# Patient Record
Sex: MALE | Race: WHITE | NOT HISPANIC OR LATINO | Employment: FULL TIME | URBAN - METROPOLITAN AREA
[De-identification: names, ages, dates, MRNs, and addresses within clinical notes are randomized per-mention and may not be internally consistent; named-entity substitution may affect disease eponyms.]

---

## 2017-09-14 ENCOUNTER — OFFICE VISIT (OUTPATIENT)
Dept: FAMILY MEDICINE CLINIC | Facility: CLINIC | Age: 47
End: 2017-09-14

## 2017-09-14 VITALS
BODY MASS INDEX: 30.75 KG/M2 | DIASTOLIC BLOOD PRESSURE: 68 MMHG | TEMPERATURE: 98.6 F | WEIGHT: 227 LBS | SYSTOLIC BLOOD PRESSURE: 112 MMHG | OXYGEN SATURATION: 98 % | HEIGHT: 72 IN | HEART RATE: 60 BPM

## 2017-09-14 DIAGNOSIS — L81.9 HYPERPIGMENTATION: Primary | ICD-10-CM

## 2017-09-14 DIAGNOSIS — Z71.6 ENCOUNTER FOR TOBACCO USE CESSATION COUNSELING: ICD-10-CM

## 2017-09-14 DIAGNOSIS — I83.90 VARICOSE VEINS OF LOWER EXTREMITY: ICD-10-CM

## 2017-09-14 DIAGNOSIS — Z23 IMMUNIZATION DUE: ICD-10-CM

## 2017-09-14 DIAGNOSIS — Z72.0 SMOKELESS TOBACCO USE: ICD-10-CM

## 2017-09-14 PROCEDURE — 90471 IMMUNIZATION ADMIN: CPT | Performed by: NURSE PRACTITIONER

## 2017-09-14 PROCEDURE — 99214 OFFICE O/P EST MOD 30 MIN: CPT | Performed by: NURSE PRACTITIONER

## 2017-09-14 PROCEDURE — 90715 TDAP VACCINE 7 YRS/> IM: CPT | Performed by: NURSE PRACTITIONER

## 2017-09-14 NOTE — PROGRESS NOTES
"Subjective   Damian Valles III is a 47 y.o. male.     History of Present Illness     Re-establish care.  I have not seen him in 3 1/2 years.  He did see my assoc last year for tx of acute sinusitis.        \"Ring worm\" on right leg 6-7 years ago.  Cleared after 3 years--no eval.  Now for past 3 years noted very dark skin involving the area.      Daily smokeless tobacco use x 30 years.  Wants to quit.      Tetanus is not up to date.      The following portions of the patient's history were reviewed and updated as appropriate: allergies, current medications, past family history, past medical history, past social history, past surgical history and problem list.    Review of Systems   Respiratory: Negative.    Cardiovascular: Negative.    Skin:        Per HPI   All other systems reviewed and are negative.      Objective   Physical Exam   Constitutional: Vital signs are normal. He appears well-developed and well-nourished.   Cardiovascular: Normal rate, regular rhythm, S1 normal, S2 normal and normal heart sounds.    No murmur heard.  Nonthrombosed varicosities noted distal RLE   Pulmonary/Chest: Effort normal and breath sounds normal.   Neurological: He is alert.   Skin: Skin is warm and dry.        hyperpigementation   Psychiatric: He has a normal mood and affect.   Nursing note and vitals reviewed.      Assessment/Plan   Damian was seen today for follow-up.    Diagnoses and all orders for this visit:    Hyperpigmentation  -     Ambulatory Referral to Dermatology    Varicose veins of lower extremity    Immunization due    Smokeless tobacco use    Encounter for tobacco use cessation counseling    Other orders  -     Tdap Vaccine Greater Than or Equal To 8yo IM        5 min spent discussing risks of smokeless tobacco use and cessation methods/options.  Nicotine patches, nicotine gum, and Chantix were all dicussed.  He is going to think about it.     I think the hyperpigmentation he has is probably just benign " sequelae from the chronic skin rash had in remote past.  Will refer to derm for evaluation though.    RTC in next month for CPE/fasting labs/EKG.  Will check a PSA due to FH prostate CA--father.

## 2017-09-22 ENCOUNTER — TELEPHONE (OUTPATIENT)
Dept: FAMILY MEDICINE CLINIC | Facility: CLINIC | Age: 47
End: 2017-09-22

## 2017-09-22 NOTE — TELEPHONE ENCOUNTER
----- Message from YANA Bolden sent at 9/22/2017 12:24 PM EDT -----  Regarding: RE: Chantix  Let pt know that you called in rx into his pharmacy.    Please call the following prescriptions into his pharmacy:    Chantix Start Kit:  Sig: Take as directed.    Dispense #1 with 0 refills.    Chantix Continuation Kit:   Sig: Take as directed.  Dispense #1 with 2 refills.          ----- Message -----     From: Nam Vee MA     Sent: 9/22/2017  11:48 AM       To: YANA Bolden  Subject: Chantix                                          Patient was in on 9/14 and you all discussed Chantix. He said he thought about it, and he would like to start it.

## 2017-10-18 RX ORDER — VARENICLINE TARTRATE 1 MG/1
TABLET, FILM COATED ORAL
Refills: 2 | COMMUNITY
Start: 2017-09-22

## 2017-10-23 ENCOUNTER — TELEPHONE (OUTPATIENT)
Dept: FAMILY MEDICINE CLINIC | Facility: CLINIC | Age: 47
End: 2017-10-23

## 2017-10-23 ENCOUNTER — OFFICE VISIT (OUTPATIENT)
Dept: FAMILY MEDICINE CLINIC | Facility: CLINIC | Age: 47
End: 2017-10-23

## 2017-10-23 VITALS
HEART RATE: 63 BPM | TEMPERATURE: 98 F | OXYGEN SATURATION: 98 % | WEIGHT: 228 LBS | BODY MASS INDEX: 30.88 KG/M2 | HEIGHT: 72 IN | SYSTOLIC BLOOD PRESSURE: 130 MMHG | DIASTOLIC BLOOD PRESSURE: 82 MMHG

## 2017-10-23 DIAGNOSIS — Z00.00 ANNUAL PHYSICAL EXAM: Primary | ICD-10-CM

## 2017-10-23 DIAGNOSIS — Z23 NEED FOR IMMUNIZATION AGAINST INFLUENZA: ICD-10-CM

## 2017-10-23 DIAGNOSIS — R06.83 SNORING: ICD-10-CM

## 2017-10-23 DIAGNOSIS — E66.9 OBESITY (BMI 30.0-34.9): ICD-10-CM

## 2017-10-23 PROBLEM — E66.811 OBESITY (BMI 30.0-34.9): Status: ACTIVE | Noted: 2017-10-23

## 2017-10-23 LAB
25(OH)D3+25(OH)D2 SERPL-MCNC: 31.3 NG/ML (ref 30–100)
ALBUMIN SERPL-MCNC: 5 G/DL (ref 3.5–5.2)
ALBUMIN/GLOB SERPL: 1.8 G/DL
ALP SERPL-CCNC: 65 U/L (ref 39–117)
ALT SERPL-CCNC: 32 U/L (ref 1–41)
APPEARANCE UR: CLEAR
AST SERPL-CCNC: 15 U/L (ref 1–40)
BACTERIA #/AREA URNS HPF: NORMAL /HPF
BASOPHILS # BLD AUTO: 0.02 10*3/MM3 (ref 0–0.2)
BASOPHILS NFR BLD AUTO: 0.3 % (ref 0–1.5)
BILIRUB SERPL-MCNC: 0.9 MG/DL (ref 0.1–1.2)
BILIRUB UR QL STRIP: NEGATIVE
BUN SERPL-MCNC: 14 MG/DL (ref 6–20)
BUN/CREAT SERPL: 11.5 (ref 7–25)
CALCIUM SERPL-MCNC: 10.4 MG/DL (ref 8.6–10.5)
CASTS URNS MICRO: NORMAL
CHLORIDE SERPL-SCNC: 102 MMOL/L (ref 98–107)
CHOLEST SERPL-MCNC: 212 MG/DL (ref 0–200)
CHOLEST/HDLC SERPL: 4.82 {RATIO}
CO2 SERPL-SCNC: 27.9 MMOL/L (ref 22–29)
COLOR UR: (no result)
CREAT SERPL-MCNC: 1.22 MG/DL (ref 0.76–1.27)
DEVELOPER EXPIRATION DATE: NORMAL
DEVELOPER LOT NUMBER: NORMAL
EOSINOPHIL # BLD AUTO: 0.37 10*3/MM3 (ref 0–0.7)
EOSINOPHIL NFR BLD AUTO: 5.6 % (ref 0.3–6.2)
EPI CELLS #/AREA URNS HPF: NORMAL /HPF
ERYTHROCYTE [DISTWIDTH] IN BLOOD BY AUTOMATED COUNT: 13.1 % (ref 11.5–14.5)
EXPIRATION DATE: NORMAL
FECAL OCCULT BLOOD SCREEN, POC: NEGATIVE
GFR SERPLBLD CREATININE-BSD FMLA CKD-EPI: 64 ML/MIN/1.73
GFR SERPLBLD CREATININE-BSD FMLA CKD-EPI: 77 ML/MIN/1.73
GLOBULIN SER CALC-MCNC: 2.8 GM/DL
GLUCOSE SERPL-MCNC: 124 MG/DL (ref 65–99)
GLUCOSE UR QL: NEGATIVE
HCT VFR BLD AUTO: 48.8 % (ref 40.4–52.2)
HDLC SERPL-MCNC: 44 MG/DL (ref 40–60)
HGB BLD-MCNC: 16.5 G/DL (ref 13.7–17.6)
HGB UR QL STRIP: NEGATIVE
IMM GRANULOCYTES # BLD: 0.02 10*3/MM3 (ref 0–0.03)
IMM GRANULOCYTES NFR BLD: 0.3 % (ref 0–0.5)
KETONES UR QL STRIP: NEGATIVE
LDLC SERPL CALC-MCNC: 142 MG/DL (ref 0–100)
LEUKOCYTE ESTERASE UR QL STRIP: NEGATIVE
LYMPHOCYTES # BLD AUTO: 3.26 10*3/MM3 (ref 0.9–4.8)
LYMPHOCYTES NFR BLD AUTO: 49.1 % (ref 19.6–45.3)
Lab: NORMAL
MCH RBC QN AUTO: 31.5 PG (ref 27–32.7)
MCHC RBC AUTO-ENTMCNC: 33.8 G/DL (ref 32.6–36.4)
MCV RBC AUTO: 93.1 FL (ref 79.8–96.2)
MONOCYTES # BLD AUTO: 0.48 10*3/MM3 (ref 0.2–1.2)
MONOCYTES NFR BLD AUTO: 7.2 % (ref 5–12)
NEGATIVE CONTROL: NEGATIVE
NEUTROPHILS # BLD AUTO: 2.49 10*3/MM3 (ref 1.9–8.1)
NEUTROPHILS NFR BLD AUTO: 37.5 % (ref 42.7–76)
NITRITE UR QL STRIP: NEGATIVE
PH UR STRIP: 6 [PH] (ref 5–8)
PLATELET # BLD AUTO: 183 10*3/MM3 (ref 140–500)
POSITIVE CONTROL: POSITIVE
POTASSIUM SERPL-SCNC: 4.8 MMOL/L (ref 3.5–5.2)
PROT SERPL-MCNC: 7.8 G/DL (ref 6–8.5)
PROT UR QL STRIP: NEGATIVE
PSA SERPL-MCNC: 0.89 NG/ML (ref 0–4)
RBC # BLD AUTO: 5.24 10*6/MM3 (ref 4.6–6)
RBC #/AREA URNS HPF: NORMAL /HPF
SODIUM SERPL-SCNC: 143 MMOL/L (ref 136–145)
SP GR UR: 1.02 (ref 1–1.03)
TRIGL SERPL-MCNC: 130 MG/DL (ref 0–150)
TSH SERPL DL<=0.005 MIU/L-ACNC: 1.06 MIU/ML (ref 0.27–4.2)
UROBILINOGEN UR STRIP-MCNC: (no result) MG/DL
VLDLC SERPL CALC-MCNC: 26 MG/DL (ref 5–40)
WBC # BLD AUTO: 6.64 10*3/MM3 (ref 4.5–10.7)
WBC #/AREA URNS HPF: NORMAL /HPF

## 2017-10-23 PROCEDURE — 90471 IMMUNIZATION ADMIN: CPT | Performed by: NURSE PRACTITIONER

## 2017-10-23 PROCEDURE — 99396 PREV VISIT EST AGE 40-64: CPT | Performed by: NURSE PRACTITIONER

## 2017-10-23 PROCEDURE — 93000 ELECTROCARDIOGRAM COMPLETE: CPT | Performed by: NURSE PRACTITIONER

## 2017-10-23 PROCEDURE — 90686 IIV4 VACC NO PRSV 0.5 ML IM: CPT | Performed by: NURSE PRACTITIONER

## 2017-10-23 PROCEDURE — 82274 ASSAY TEST FOR BLOOD FECAL: CPT | Performed by: NURSE PRACTITIONER

## 2017-10-23 RX ORDER — AZELASTINE HYDROCHLORIDE, FLUTICASONE PROPIONATE 137; 50 UG/1; UG/1
2 SPRAY, METERED NASAL 2 TIMES DAILY
Qty: 1 BOTTLE | Refills: 5 | Status: SHIPPED | OUTPATIENT
Start: 2017-10-23

## 2017-10-23 NOTE — PROGRESS NOTES
Subjective   Damian Valles III is a 47 y.o. male.     History of Present Illness     Here for CPE.  Fasting for labs.    Plans to quit using smokeless tobacco.  Started Chantix today.  Has been using smokeless tobacco x 30 years.  Dental exam UTD.    Eye exam UTD.    Tdap given at last office visit.  Needs flu vaccine.    Not exercising enough.      Nasal congestion--chronic.  + snores.        The following portions of the patient's history were reviewed and updated as appropriate: allergies, current medications, past family history, past medical history, past social history, past surgical history and problem list.    Review of Systems   Constitutional: Negative.    HENT: Positive for congestion.    Respiratory: Negative.    Cardiovascular: Negative.    Psychiatric/Behavioral: Positive for sleep disturbance.   All other systems reviewed and are negative.      Objective   Physical Exam   Constitutional: He is oriented to person, place, and time. Vital signs are normal. He appears well-developed and well-nourished. He is cooperative.   HENT:   Head: Normocephalic and atraumatic.   Right Ear: Hearing, tympanic membrane, external ear and ear canal normal.   Left Ear: Hearing, tympanic membrane, external ear and ear canal normal.   Nose: Nose normal.   Mouth/Throat: Uvula is midline, oropharynx is clear and moist and mucous membranes are normal. No tonsillar exudate.   Mallampati Class 3   Eyes: Conjunctivae, EOM and lids are normal. Pupils are equal, round, and reactive to light.   Neck: Trachea normal, normal range of motion, full passive range of motion without pain and phonation normal. Neck supple. Carotid bruit is not present. No thyromegaly present.   Cardiovascular: Normal rate, regular rhythm, S1 normal and S2 normal.    Pulses:       Carotid pulses are 2+ on the right side, and 2+ on the left side.       Radial pulses are 2+ on the right side, and 2+ on the left side.        Femoral pulses are 2+ on the  right side, and 2+ on the left side.       Dorsalis pedis pulses are 2+ on the right side, and 2+ on the left side.        Posterior tibial pulses are 2+ on the right side, and 2+ on the left side.   Pulmonary/Chest: Effort normal and breath sounds normal. He has no decreased breath sounds. He has no wheezes. He has no rhonchi. He has no rales.   Abdominal: Soft. Normal appearance, normal aorta and bowel sounds are normal. He exhibits no distension. There is no hepatosplenomegaly, splenomegaly or hepatomegaly. There is no tenderness. No hernia. Hernia confirmed negative in the ventral area, confirmed negative in the right inguinal area and confirmed negative in the left inguinal area.   Genitourinary: Rectum normal, testes normal and penis normal. Rectal exam shows guaiac negative stool.   Musculoskeletal: Normal range of motion.        Right shoulder: Normal.        Left shoulder: Normal.        Right elbow: Normal.       Left elbow: Normal.        Right wrist: Normal.        Left wrist: Normal.        Right hip: Normal.        Left hip: Normal.        Right knee: Normal.        Left knee: Normal.        Right ankle: Normal.        Left ankle: Normal.        Cervical back: Normal.        Thoracic back: Normal.        Lumbar back: Normal.        Right upper arm: Normal.        Left upper arm: Normal.        Right forearm: Normal.        Left forearm: Normal.        Right hand: Normal.        Left hand: Normal.        Right upper leg: Normal.        Left upper leg: Normal.        Right lower leg: Normal.        Left lower leg: Normal.        Right foot: Normal.        Left foot: Normal.   Lymphadenopathy:        Head (right side): No submental, no submandibular and no tonsillar adenopathy present.        Head (left side): No submental, no submandibular and no tonsillar adenopathy present.     He has no cervical adenopathy.     He has no axillary adenopathy.        Right: No inguinal and no supraclavicular adenopathy  present.        Left: No inguinal and no supraclavicular adenopathy present.   Neurological: He is alert and oriented to person, place, and time. He has normal strength and normal reflexes. No cranial nerve deficit or sensory deficit. He displays a negative Romberg sign. GCS eye subscore is 4. GCS verbal subscore is 5. GCS motor subscore is 6.   Skin: Skin is warm, dry and intact. No rash noted. No cyanosis. Nails show no clubbing.   Psychiatric: He has a normal mood and affect. His speech is normal and behavior is normal. Judgment and thought content normal. Cognition and memory are normal.   Nursing note and vitals reviewed.      Assessment/Plan   Damian was seen today for annual exam.    Diagnoses and all orders for this visit:    Annual physical exam  -     CBC & Differential  -     Lipid Panel With / Chol / HDL Ratio  -     Comprehensive Metabolic Panel  -     Urinalysis With Microscopic - Urine, Clean Catch  -     PSA  -     TSH Rfx On Abnormal To Free T4  -     Vitamin D 25 Hydroxy  -     ECG 12 Lead  -     POC Occult Blood Stool    Need for immunization against influenza  -     Flu Vaccine Intradermal Quad 18-64YR    Snoring  -     Ambulatory Referral to Sleep Medicine    Obesity (BMI 30.0-34.9)  -     ECG 12 Lead    Other orders  -     Azelastine-Fluticasone (DYMISTA) 137-50 MCG/ACT suspension; 2 sprays into each nostril 2 (Two) Times a Day.        Await lab results.  Refer to sleep medicine.  Advised to lose weight, increase exercise.

## 2017-10-23 NOTE — PROGRESS NOTES
Procedure     ECG 12 Lead  Date/Time: 10/23/2017 3:25 PM  Performed by: ONOFRE CLINE  Authorized by: ONOFRE CLINE   Interpreted by ED physician: Interpreted by myself.  Comparison: not compared with previous ECG   Previous ECG: no previous ECG available  Rhythm: sinus rhythm  Rate: normal  ST Segments: ST segments normal  T Waves: T waves normal  QRS axis: normal  Other: no other findings  Clinical impression: normal ECG

## 2017-12-19 ENCOUNTER — OFFICE VISIT (OUTPATIENT)
Dept: SLEEP MEDICINE | Facility: HOSPITAL | Age: 47
End: 2017-12-19
Attending: PSYCHIATRY & NEUROLOGY

## 2017-12-19 VITALS
BODY MASS INDEX: 32.62 KG/M2 | SYSTOLIC BLOOD PRESSURE: 99 MMHG | HEIGHT: 71 IN | DIASTOLIC BLOOD PRESSURE: 68 MMHG | TEMPERATURE: 97 F | OXYGEN SATURATION: 97 % | WEIGHT: 233 LBS | HEART RATE: 80 BPM

## 2017-12-19 DIAGNOSIS — G47.33 OSA (OBSTRUCTIVE SLEEP APNEA): Primary | ICD-10-CM

## 2017-12-19 DIAGNOSIS — R06.83 SNORING: ICD-10-CM

## 2017-12-19 DIAGNOSIS — G47.10 HYPERSOMNIA: ICD-10-CM

## 2017-12-19 PROCEDURE — G0463 HOSPITAL OUTPT CLINIC VISIT: HCPCS

## 2017-12-19 NOTE — PROGRESS NOTES
Sleep Medicine initial Consultation    Damian Valles III  : 1970  47 y.o. male   Date of Service: 2017  Referring provider: YANA Bolden    History Of Present Illness:   Mr. Damian Valles III  is a 47 y.o. patient is here for the evaluation of Snoring, Excessive Daytime Sleepiness and Chronic Fatigue.    The patient c/o excessive daytime sleepiness, , chronic fatigue and difficulty driving to to sleepiness.    The patient complains of loud in all sleeping positions, awakened gasping for breath and wakened coughing, choking, or respiratory discomfort.    The patient complains of bruxism during sleep and awakened with gastric discomfort or burning sensation in the chest or sour taste.    The patient complains of frequent awakenings urination.    The patient reports history of sleepwalking and frequent nightmares    Sleep schedule: Bedtime: Between 12 midnight to 2 AM , gets out of bed at 6 AM, sleep latency: Few minutes, Gets about 4r-5 hours of sleep.    [place] Sleepiness Scale score: 11/24.  History reviewed. No pertinent past medical history.  Past Surgical History:   Procedure Laterality Date   • ORAL MUCOCELE EXCISION     • TONSILLECTOMY       Current Outpatient Prescriptions on File Prior to Visit   Medication Sig Dispense Refill   • Azelastine-Fluticasone (DYMISTA) 137-50 MCG/ACT suspension 2 sprays into each nostril 2 (Two) Times a Day. 1 bottle 5   • CHANTIX CONTINUING MONTH CECY 1 MG tablet TAKE AS DIRECTED  2   • CHANTIX STARTING MONTH CECY 0.5 MG X 11 & 1 MG X 42 tablet TAKE AS DIRECTED 53 tablet 0   • CHANTIX STARTING MONTH CECY 0.5 MG X 11 & 1 MG X 42 tablet TAKE AS DIRECTED  0     No current facility-administered medications on file prior to visit.      No Known Allergies  Family History   Problem Relation Age of Onset   • Hypertension Mother      good levels now   • Thyroid disease Mother    • Prostate cancer Father    • Heart disease Paternal Grandfather      Social  "History     Social History   • Marital status:      Spouse name: Chevy   • Number of children: 2   • Years of education: N/A     Occupational History   •       Social History Main Topics   • Smoking status: Former Smoker     Types: Cigarettes   • Smokeless tobacco: Current User     Types: Chew   • Alcohol use Yes      Comment: social   • Drug use: No   • Sexual activity: Yes     Partners: Female     Other Topics Concern   • Not on file     Social History Narrative     Review of Systems   HENT: Positive for postnasal drip.    Respiratory: Positive for cough.    All other systems reviewed and are negative.    Patient examination:  Vitals:    12/19/17 0844   BP: 99/68   BP Location: Left arm   Patient Position: Sitting   Pulse: 80   Temp: 97 °F (36.1 °C)   TempSrc: Oral   SpO2: 97%   Weight: 106 kg (233 lb)   Height: 180.3 cm (71\")   BMI 32.5 and Neck Collar size 16.5\".  HEENT: Normal and Mallampati Class II: Soft palate, fauces, uvula visible   Neck: Supple no carotid bruits.  Lungs: Clear to auscultation.  Cardiac exam: Normal and regular rate and rhythm. No murmurs.  Extremities: No edema clubbing or cyanosis.    ASSESSMENT AND PLAN:The patient has symptoms of obstructive sleep apnea syndrome with hypersomnia.  We will proceed with polysomnography and treated with CPAP therapy if needed.  Sleep hygiene techniques discussed.  Exercise diet and weight loss discussed.    Encounter Diagnoses   Name Primary?   • Snoring    • KINA (obstructive sleep apnea) Yes   • Hypersomnia        Orders Placed This Encounter   Procedures   • Home Sleep Study   • Polysomnography 4 or More Parameters       Return in about 3 months (around 3/19/2018).    Grecia Arreola MD  12/19/2017  9:43 AM    "

## 2018-02-12 ENCOUNTER — APPOINTMENT (OUTPATIENT)
Dept: SLEEP MEDICINE | Facility: HOSPITAL | Age: 48
End: 2018-02-12
Attending: PSYCHIATRY & NEUROLOGY

## 2021-12-06 ENCOUNTER — E-VISIT (OUTPATIENT)
Dept: FAMILY MEDICINE CLINIC | Facility: TELEHEALTH | Age: 51
End: 2021-12-06

## 2021-12-06 PROCEDURE — BRIGHTMDVISIT: Performed by: NURSE PRACTITIONER

## 2021-12-06 NOTE — E-VISIT TREATED
Chief Complaint: Coronavirus (COVID-19), cold, sinus pain, allergy, or flu   Patient introduction   Patient is 51-year-old male who reports cough, congestion, rhinitis, sore throat, voice hoarseness, and headache that started 6-9 days ago.   Patient has not requested COVID testing.   Coronavirus Disease 2019 (COVID-19) exposure, testing history, and vaccination status:    No known exposure to a confirmed or suspected case of COVID-19.    No recent travel outside of their local community.    Patient had a viral test 1-3 months ago. Test result was negative.    Reports receiving 2 doses.    Received the Moderna vaccine for the first dose.    Received the Moderna vaccine for the second dose.    Received their most recent dose of the vaccine > 14 days ago.   Warning. The following may warrant further investigation:    BMI of 30 to 39   When asked why they're seeking care online today, patient reports they want a specific treatment or medication. Patient writes: Antibiotic prescription   Patient-submitted comments:   Patient writes: Early onset, I treated with OTC meds?.they helped manage symptoms. Symptoms did not go away, nasal discharge turned green and bloody; added by facial pain/headache. Continued treatment with OTC meds showed improvement, but I know there's nasal inflammation and most probably bacterial infection that won't go away w/o antibiotics, even though I'm beginning to feel better?..rather be safe than have a more severe repeat in three weeks?..   Patient did not request an excuse note.   General presentation   Patient reports improvement of symptoms. Symptoms came on gradually.   Fever:    Denies fever.   Sinus and nasal symptoms:    Reports rhinitis.    Reports green nasal drainage.    Nasal drainage is thick.    Reports congestion with sinus pain or pressure on or around their cheeks and upper teeth or jaw.    Patient first noticed sinus pain 5-9 days ago.    Sinus pain is worse with Valsalva.     Denies itchy nose or sneezing.    Denies history of unhealed nasal septal ulcer/nasal wound.    Denies antibiotic treatment for sinus infection in the last year.    Denies history of deviated septum or nasal polyps.   Sore throat:    Reports sore throat.    Reports previous tonsillectomy.    Denies recent strep exposure.    Patient does not think they have strep.    Patient is able to swallow liquids and solid foods with ease.    Reports mild hoarseness. Patient doesn't believe hoarseness is due to voice strain.   Head and body aches:    Reports headache, described as moderate (4-6 on a scale of 1-10).    Denies sweats.    Denies chills.    Denies myalgia.    Denies fatigue.   Dizziness:    Reports mild dizziness that does not interfere with daily activities.   Cough:    Reports cough.    Cough is worse in the morning, during the day, and at night/while sleeping.    Cough is not productive of sputum.   Wheezing and SOB:    Denies COPD diagnosis.    Denies asthma diagnosis.    Denies wheezing.    Denies shortness of breath.    Denies previous albuterol inhaler use during URIs, bronchitis, or pneumonia.    Denies previous steroid inhaler use during URIs, bronchitis, or pneumonia.   Chest pain:    Denies chest pain.   Allergies:    Denies history of allergies.   Flu exposure:    Denies recent exposure to confirmed flu diagnosis.    Denies receiving a flu vaccine this season.   Patient denies the following red flags:    Changes in alertness or awareness    Symptoms suggesting airway obstruction    Symptoms suggesting intracranial hemorrhage    Decreased urination   Self-exam:    No difficulty moving their chin toward their chest    No palatal petechiae   Denies antibiotic treatment for similar symptoms within the past month.   Current medications   Reports taking over-the-counter medication for current symptoms. Patient has taken acetaminophen, dextromethorphan, guaifenesin, guaifenesin/dextromethorphan, ibuprofen, and  phenylephrine.   Denies taking other medications or supplements.   Medication allergies   None.   Medication contraindication review   Denies history of anaphylactic reaction to beta-lactam antibiotics; aspirin triad; blood dyscrasia; bone marrow depression; catecholamine-releasing paraganglioma; coronary artery disease; coagulation disorder; congenital long QT syndrome; depression; electrolyte abnormalities; fungal infection; GI bleeding; GI obstruction; G6PD deficiency; heart arrhythmia; hypertension; kidney disease or hemodialysis; mononucleosis; myasthenia; recent myocardial infarction; NSAID-induced asthma/urticaria; Parkinson's disease; pheochromocytoma; porphyria; Reye syndrome; seizure disorder; ulcerative colitis; and urinary retention.   Denies history of metoclopramide-associated dystonic reaction and tardive dyskinesia.   No known history of amoxicillin-clavulanate-associated cholestatic jaundice or hepatic impairment.   No known history of azithromycin-associated cholestatic jaundice or hepatic impairment.   Past medical history   Immune conditions: Denies immunocompromising conditions. Denies history of cancer.   Social history   High-risk household contacts: Patient's household includes one or more persons with the following: asthma.   Former smoker.   Assessment   Bacterial sinusitis. Ruled out: Traumatic laryngitis.   This is the likely diagnosis based on patient's interview responses, including:    Congestion or sinus pressure    Thick nasal discharge    Yellow or green mucus    Duration of symptoms > 5 days   HHS required information for COVID-19 lab data reporting (if test is ordered):   Symptoms:    Cough    Headache    Sore throat    Nasal congestion    Rhinitis   Symptom onset: 6-9 days ago ago    Healthcare worker? No  Resident in a congregate care setting? No  Previous history of COVID-19 testing: Patient had a viral test 1-3 months ago. Test result was negative.     Plan   Medications:     amoxicillin 875 mg-potassium clavulanate 125 mg tablet RX 875mg/125mg 1 tab PO bid 10d for infection. This medication is an antibiotic. Take it exactly as directed. You must finish the entire course of medication, even if you feel better after the first few days of treatment. Amount is 20 tab.   The patient's prescription will be sent to:   [facility]/pharmacy #[id number]. [facility] [id number]   Phone: [phone]     Fax: [phone]   Education:    Condition and causes    Prevention    Treatment and self-care    When to call provider      ----------   Electronically signed by YANA Ashley on 2021-12-06 at 07:14AM   ----------   Patient Interview Transcript:   Why are you getting care through eVisit today? We can't guarantee a specific treatment or test. Your provider will decide what's best for you. You'll have a chance to tell us more at the end of the interview. Select all that apply.    I want a specific treatment or medication   Not selected:    I want to know if I have a cold or something more serious    I want to know if I need to be seen by a provider    I need a doctor's note    I want to be tested for COVID-19    I want to get the COVID-19 vaccine    I think I'm having side effects from the COVID-19 vaccine    I just want to feel better!    None of the above   Tell us which specific treatment or medication you'd like. Your provider will make the final decision on which treatment is best for your condition.    Antibiotic prescription   Which of these symptoms are bothering you? Select all that apply.    Cough    Stuffed-up nose or sinuses    Runny nose    Sore throat    Hoarse voice or loss of voice    Headache   Not selected:    Shortness of breath    Fever    Itchy or watery eyes    Itchy nose or sneezing    Loss of smell or taste    Sweats    Chills    Muscle or body aches    Fatigue or tiredness    Nausea or vomiting    Diarrhea    I don't have any of these symptoms   Before we  learn more about why you're here, we'll get some information related to COVID-19. We'll ask about risk factors, testing, vaccination status, and exposure. Do you have any of these conditions? If so, you may be at increased risk for complications from COVID-19. Select all that apply.    None of the above   Not selected:    Chronic lung disease, such as cystic fibrosis or interstitial fibrosis    Heart disease, such as congenital heart disease, congestive heart failure, or coronary artery disease    Disorder of the brain, spinal cord, or nerves and muscles, such as dementia, cerebral palsy, epilepsy, muscular dystrophy, or developmental delay    Metabolic disorder or mitochondrial disease    Cerebrovascular disease, such as stroke or another condition affecting the blood vessels or blood supply to the brain   Do you live in a group care setting? Examples include: - Nursing home - Residential care - Psychiatric treatment facility - Group home - [facility] - Board and care home - Homeless shelter - Foster care setting Select one.    No   Not selected:    Yes   Have you ever been tested for COVID-19? Select one.    Yes   Not selected:    No   When was your most recent COVID-19 test? Select one.    1 to 3 months ago   Not selected:    Within the last week    7 to 14 days ago    15 to 30 days ago    More than 3 months ago   What type of COVID-19 test did you have? There are 2 types of COVID-19 tests: - Viral tests check if you're currently infected with COVID-19. - Antibody tests check if you've been infected in the past. Select one.    Viral test for current infection   Not selected:    Antibody test for past infection   What was the result of your most recent COVID-19 test? Select one.    Negative (no sign of infection)   Not selected:    Positive (signs of current or past infection)    I'm not sure   Have you gotten the COVID-19 vaccine? Select one.    Yes   Not selected:    No   How many doses of the COVID-19 vaccine  "have you gotten? This includes boosters. Select one.    2 doses   Not selected:    1 dose    3 doses   Which COVID-19 vaccine did you get for your first dose? Check your Vaccination Record Card under Product Name/. Select one.    Moderna   Not selected:    Hai & Hai's Yudy Vaccine (J&J/Yudy)    Pfizer-BioNTech (Pfizer)   Which COVID-19 vaccine did you get for your second dose? Check your Vaccination Record Card under Product Name/. Select one.    Moderna   Not selected:    Hai & Hai's Yudy Vaccine (J&J/Yudy)    Pfizer-BioNTech (Pfizer)   When did you get your most recent dose of the COVID-19 vaccine?    More than 14 days ago   Not selected:    Less than 48 hours (2 days) ago    48 to 72 hours (3 days) ago    3 to 5 days ago    5 to 7 days ago    7 to 14 days ago   In the last 14 days, have you traveled outside of your local community? This includes travel by car, RV, bus, train, or plane. Travel increases your chances of getting and spreading COVID-19. Select one.    No   Not selected:    Yes   In the last 14 days, have you had close contact with someone who has coronavirus (COVID-19)? \"Close contact\" means any of these: - Living in the same household as someone with COVID-19. - Caring for someone with COVID-19. - Being within 6 feet of someone with COVID-19 for a total of at least 15 minutes over a 24-hour period. For example, three 5-minute exposures for a total of 15 minutes. - Being in direct contact with respiratory droplets from someone with COVID-19 (being coughed on, kissing, sharing utensils). Select one.    No, not that I know of   Not selected:    Yes, a confirmed case    Yes, a suspected case   Thanks for completing our COVID-19 questions. Now we'll return to your symptoms. When did your symptoms start? If you know the exact date your symptoms started, choose Other and enter the month and day. Select one.    6 to 9 days ago   Not selected:    Less than " 48 hours ago    3 to 5 days ago    10 to 14 days ago    2 to 4 weeks ago    More than a month ago    Other (specify)   Did your symptoms come on suddenly or gradually? Select one.    Gradually   Not selected:    Suddenly    I'm not sure   Have your symptoms improved at all since they began? Select one.    Yes, but they haven't gone away completely   Not selected:    Yes, but then they came back worse than before    No    I'm not sure   You mentioned having a headache. On a scale of 1 to 10, how severe is your headache pain? Select one.    Moderate (4 to 6)   Not selected:    Mild (1 to 3)    Severe (7 to 9)    Unbearable (10)    The worst headache of my life (10+)   Do you cough so hard that it's made you gag or vomit? By gag, we mean has your coughing made you choke or dry heave? Select all that apply.    No   Not selected:    Yes, my coughing has made me gag    Yes, my coughing has made me vomit   When is your cough the worst? Select all that apply.    In the morning, or when I wake up    During the day    At nighttime, or while I'm sleeping   Not selected:    I'm not sure   Are you coughing up mucus or phlegm? Select one.    No, my cough is dry   Not selected:    Yes, a little    Yes, a lot   You mentioned having a stuffy nose or sinus congestion. Do you feel pain or pressure in your sinuses?    Yes   Not selected:    No    I'm not sure   Where do you feel sinus pain or pressure?    In my cheeks    In my upper teeth or jaw   Not selected:    In my forehead    Around my eyes    Behind my nose    I'm not sure   When did you first notice your sinus pain or pressure? Select one.    5 to 9 days ago   Not selected:    Less than 5 days ago    10 to 14 days ago    2 to 4 weeks ago    1 month ago or longer   Does coughing, sneezing, or leaning forward make your sinuses feel worse? Select one.    Yes   Not selected:    No    I'm not sure   What color is your nasal drainage? Select one.    Green   Not selected:    Clear    " White    Yellow    My nose is stuffed but not draining or running    I'm not sure   Is your nasal drainage thick or thin? Select one.    Thick   Not selected:    Thin    I'm not sure   Is there any drainage (mucus) going down the back of your throat? This kind of drainage is also called \"postnasal drip.\" Select one.    I'm not sure   Not selected:    Yes    No   Can you swallow liquids and solid foods? A sore throat may be painful when swallowing, but it shouldn't prevent you from swallowing. Select one.    Yes, with ease   Not selected:    Yes, but it's uncomfortable    Yes, but it's painful    It's hard to swallow anything because it feels like liquids and food get stuck in my throat    No, I can't swallow anything, liquid or solid foods   Since your symptoms started, have you felt dizzy? Select one.    Yes, but I can continue with my regular daily activities   Not selected:    Yes, and it makes it hard to stand, walk, or do daily activities    No   Do you have chest pain? You might also feel it as discomfort, aching, tightness, or squeezing in the chest. Select one.    No   Not selected:    Yes   Have you urinated at least 3 times in the last 24 hours? Select one.    Yes   Not selected:    No    I'm not sure   Changes in alertness or awareness may mean you need emergency care. Since your symptoms started, have you had any of these? Select all that apply.    None of the above   Not selected:    Confusion    Slurred speech    Not knowing where you are or what day it is    Difficulty staying conscious    Fainting or passing out   Do your symptoms include a whistling sound, or wheezing, when you breathe? Select one.    No   Not selected:    Yes    I'm not sure   Early in this interview, you told us you were hoarse or you'd lost your voice. How would you describe the changes to your voice? Select one.    It just sounds a little raspy   Not selected:    It's harder than usual to talk    I can barely talk at all   Is " it possible that you strained your voice? Singing, yelling, or talking more or louder than usual can cause voice strain. Select one.    No   Not selected:    Yes    I'm not sure   Do you have any of these symptoms in your ear(s)? Select all that apply.    Pressure    Crackling or popping   Not selected:    Pain    Fullness    Plugged or blocked sensation    None of the above   Can you move your chin toward your chest?    Yes   Not selected:    No, my neck is too stiff   Are your tonsils larger than usual?    I've had my tonsils removed   Not selected:    Yes    No    I'm not sure   Are there red spots on the roof of your mouth or the back of your throat?    No   Not selected:    Yes    I'm not sure   Are your glands/lymph nodes swollen, or does it hurt when you touch them?    I'm not sure   Not selected:    Yes    No   People with a very high body mass index (BMI) are at higher risk for developing complications from the flu and severe illness from COVID-19. To determine your BMI, we need to know your weight and height. Please enter your weight (in pounds).    Weight   Please enter your height.    Height   In the past 2 weeks, has anyone around you (such as at school, work, or home) had a confirmed diagnosis of strep throat? A confirmed diagnosis means that a throat swab and lab test were done to verify a strep throat infection. Select one.    No   Not selected:    Yes    I'm not sure   Do you think you might have strep throat? Select one.    No   Not selected:    Yes    I'm not sure   In the past week, has anyone around you (such as at school, work, or home) had a confirmed diagnosis of the flu? A confirmed diagnosis means that a nose swab was done to verify a flu infection. Select all that apply.    No   Not selected:    I live with someone who has the flu    I've been within touching distance of someone who has the flu    I've walked by, or sat about 3 feet away from, someone who has the flu    I've been in the  same building as someone who has the flu    I'm not sure   Have you ever been diagnosed with asthma? Select one.    No   Not selected:    Yes   Have you ever been prescribed albuterol to use for wheezing, cough, or shortness of breath caused by a cold, bronchitis, or pneumonia? Albuterol (ProAir, Proventil, Ventolin) is prescribed as an inhaler or a solution to be used with a nebulizer machine. Select one.    No   Not selected:    Yes    I'm not sure   Have you ever been prescribed a steroid inhaler to use for wheezing, cough, or shortness of breath caused by a cold, bronchitis, or pneumonia? Some examples of steroid inhalers include Pulmicort, Flovent, Qvar, and Alvesco. Select one.    No   Not selected:    Yes    I'm not sure   Have you ever been diagnosed with chronic obstructive pulmonary disease (COPD)? Select one.    No   Not selected:    Yes    I'm not sure   In the last year, how many times were you treated with antibiotics for a sinus infection? Select one.    None   Not selected:    1 to 3 times    4 or more times   Have you been diagnosed with a deviated septum or nasal polyps? The nose is divided into two nostrils by the septum. A crooked septum is called a deviated septum. Nasal polyps are growths inside the nose or sinuses. Select one.    No   Not selected:    Yes, but I had surgery to treat them    Yes, I have a deviated septum    Yes, I have nasal polyps    Yes, I have a deviated septum and nasal polyps    I'm not sure   Do you have a sore inside your nose that won't heal? Select one.    No   Not selected:    Yes    I'm not sure   Do you have allergies (pollen, dust mites, mold, animal dander)? Select one.    No   Not selected:    Yes    I'm not sure   Have you had a flu shot this season? Select one.    No   Not selected:    Yes, less than 2 weeks ago    Yes, 2 to 4 weeks ago    Yes, 1 to 3 months ago    Yes, 3 to 6 months ago    Yes, more than 6 months ago    I'm not sure   The flu and COVID-19 can  be more serious for people with certain conditions or characteristics. These questions help us figure out if you or anyone you live with is at higher risk for complications from these infections. Do either of these statements apply to you? Select all that apply.    None of the above   Not selected:    I'm  or Native [place]    I'm a healthcare worker   Do you smoke tobacco? Select one.    No, I quit   Not selected:    Yes, every day    Yes, some days    No, never   Some conditions can put you at risk for more serious infections. Do any of these apply to you? Select all that apply.    None of the above   Not selected:    I've been hospitalized within the last 5 days    I have diabetes    I'm in close contact with a child in    Are you currently being treated for any of these conditions? Scroll to see all options. Select all that apply.    None of the above   Not selected:    Aspirin triad (also known as Samter's triad or ASA triad)    Asthma or hives from taking aspirin or other NSAIDs, such as ibuprofen or naproxen    Blockage or narrowing of the blood vessels of the heart    Blood dyscrasia, such anemia, leukemia, lymphoma, or myeloma    Bone marrow depression    Catecholamine-releasing paraganglioma    Blood clotting disorder    Congenital long QT syndrome    Depression    Difficulty urinating or completely emptying your bladder    Uncorrected electrolyte abnormalities    Fungal infection    Gastrointestinal (GI) bleeding    Gastrointestinal (GI) obstruction    G6PD deficiency    Recent heart attack    High blood pressure    Irregular heartbeat or heart rhythm    Kidney disease or hemodialysis    Mononucleosis (mono)    Myasthenia gravis    Parkinson's disease    Pheochromocytoma    Reye syndrome    Seizure disorder    Ulcerative colitis   Do you have any of these conditions that can affect the immune system? Scroll to see all options. Select all that apply.    None of these   Not selected:     History of bone marrow transplant    Chronic kidney disease    Chronic liver disease (including cirrhosis)    HIV/AIDS    Inflammatory bowel disease (Crohn's disease or ulcerative colitis)    Lupus    Moderate to severe plaque psoriasis    Multiple sclerosis    Rheumatoid arthritis    Sickle cell anemia    Alpha or beta thalassemia    History of solid organ transplant (kidney, liver, or heart)    History of spleen removal    An autoimmune disorder not listed here    A condition requiring treatment with long-term use of oral steroids (such as prednisone, prednisolone, or dexamethasone)   Have you ever been diagnosed with cancer? Select one.    No   Not selected:    Yes, I have cancer now    Yes, but I'm in remission   Have you ever had either of these conditions? Select all that apply.    No   Not selected:    Metoclopramide-associated dystonic reaction    Tardive dyskinesia   Do any of these apply to the people who live with you? Select all that apply.    None of the above   Not selected:    A child under the age of 5    An adult 65 or older    A person who is pregnant    A person who has given birth, had a miscarriage, had a pregnancy loss, or had an  in the last 2 weeks    An  or Native [place]   Does any member of your household have any of these medical conditions? Select all that apply.    Asthma   Not selected:    Disorders of the brain, spinal cord, or nerves and muscles, such as dementia, cerebral palsy, epilepsy, muscular dystrophy, or developmental delay    Chronic lung disease, such as COPD or cystic fibrosis    Heart disease, such as congenital heart disease, congestive heart failure, or coronary artery disease    Cerebrovascular disease, such as stroke or another condition affecting the blood vessels or blood supply to the brain    Blood disorders, such as sickle cell disease    Diabetes    Metabolic disorders such as inherited metabolic disorders or mitochondrial disease     Kidney disorders    Liver disorders    Weakened immune system due to illness or medications such as chemotherapy or steroids    Children under the age of 19 who are on long-term aspirin therapy    Extreme obesity (BMI > 40)    None of the above   Just a few more questions about medications, and then you're finished. Have you used any non-prescription medications or nasal sprays for your current symptoms? Examples include saline sprays, decongestants, NyQuil, and Tylenol. Select one.    Yes   Not selected:    No   Which of these non-prescription medications have you tried? Scroll to see all options. Select all that apply.    Acetaminophen (Tylenol)    Dextromethorphan (Delsym, Robitussin, Vicks DayQuil Cough)    Guaifenesin (Mucinex)    Guaifenesin/dextromethorphan (Delsym DM, Mucinex DM, Robitussin DM)    Ibuprofen (Advil, Motrin, Midol)    Phenylephrine (Sudafed)   Not selected:    Budesonide (Rhinocort)    Cetirizine (Zyrtec)    Chlorpheniramine (Aller-chlor, Chlor-Trimeton)    Cromolyn (NasalCrom)    Diphenhydramine (Benadryl)    Fexofenadine (Allegra)    Fluticasone (Flonase)    Ketotifen (Alaway, Zaditor)    Loratadine (Alavert, Claritin)    Naphazoline-pheniramine (Naphcon-A, Opcon-A, Visine-A)    Omeprazole (Prilosec)    Oxymetazoline (Afrin)    Triamcinolone (Nasacort)    None of the above   In the past month, have you taken antibiotics for similar symptoms? Examples of antibiotics include amoxicillin, amoxicillin-clavulanate (Augmentin), penicillin, cefdinir (Omnicef), doxycycline, and clindamycin (Cleocin). Select one.    No   Not selected:    Yes    I'm not sure   Have you taken any monoamine oxidase inhibitor (MAOI) medications in the last 14 days? Examples include rasagiline (Azilect), selegiline (Eldepryl, Zelapar), isocarboxazid (Marplan), phenelzine (Nardil), and tranylcypromine (Parnate). Select one.    No   Not selected:    Yes    I'm not sure   Do you take Kynmobi or Apokyn (apomorphine)? Select  one.    No   Not selected:    Yes    I'm not sure   Are you taking any other medications or supplements? On the next screen, you need to list all vitamins, supplements, non-prescription medications (such as aspirin or Aleve), and prescription medications that you're taking. Select one.    No   Not selected:    Yes    Yes, but I'm not sure what they are   Have you ever had an allergic or bad reaction to any medication? Select one.    No   Not selected:    Yes   Are you allergic to milk or to the proteins found in milk (for example, whey or casein)? A milk allergy is different from lactose intolerance. Select one.    No   Not selected:    Yes    I'm not sure   Have you ever had jaundice or liver problems as a result of taking amoxicillin-clavulanate (Augmentin)? Jaundice is a condition in which the skin and the whites of the eyes turn yellow. Select all that apply.    No, not that I know of   Not selected:    Yes, jaundice    Yes, liver problems   Have you ever had jaundice or liver problems as a result of taking azithromycin (Zithromax, Zmax)? Jaundice is a condition in which the skin and the whites of the eyes turn yellow. Select all that apply.    No, not that I know of   Not selected:    Yes, jaundice    Yes, liver problems   Do you need a doctor's note? A doctor's note confirms that you received care today and states when you can return to school or work. It does not contain information about your diagnosis or treatment plan. Your provider will make the final decision on whether to give you a doctor's note and for how long. Doctor's notes CANNOT be backdated. We can't provide medical leave paperwork through this type of visit. If more paperwork is needed to request time off, contact your primary care provider. Select one.    No   Not selected:    Today only (1 day)    Today and tomorrow (2 days)    3 days    7 days    10 days    14 days   Is there anything else you'd like to tell us about your symptoms?    Early  onset, I treated with OTC meds?.they helped manage symptoms. Symptoms did not go away, nasal discharge turned green and bloody; added by facial pain/headache. Continued treatment with OTC meds showed improvement, but I know there's nasal inflammation and most probably bacterial infection that won't go away w/o antibiotics, even though I'm beginning to feel better?..rather be safe than have a more severe repeat in three weeks?.   ----------   Medical history   Medical history data does not currently exist for this patient.

## 2021-12-06 NOTE — EXTERNAL PATIENT INSTRUCTIONS
Diagnosis   Bacterial sinusitis   My name is Shea Garcia, and I'm a healthcare provider at [facility]. I'm sorry you're not feeling well. I reviewed your interview, and I see that you have bacterial sinusitis.   To prevent the spread of illness to others, I recommend that you stay home and away from other people as much as possible while you're sick.   Medications   Your pharmacy   [id number]. [place] [id number] [phone]     Prescription      Amoxicillin-clavulanate (875mg/125mg): Take 1 tablet by mouth twice a day for 10 days for infection. This medication is an antibiotic. Take it exactly as directed. You must finish the entire course of medication, even if you feel better after the first few days of treatment.    Start taking the antibiotics I've prescribed right away. You need to finish the entire course of antibiotics, even if you start to feel better before the pills run out.   About your diagnosis   The sinuses are hollow spaces connected to the nasal passages. Sinusitis occurs when the sinuses swell and block the drainage of fluid and mucus from the nose, causing pain, pressure, and congestion. Fatigue, difficulty sleeping, or decreased appetite may accompany your symptoms.   More than 90% of sinus infections are caused by viruses. However, in certain cases, a sinus infection may be caused by bacteria. Bacterial sinus infections usually look like one of the following cases:    Severe sinus symptoms with a fever over 102F.    Sinus symptoms that have not improved at all after 10 days.    Cold symptoms that slowly improve but then worsen again after 5 or 6 days, usually with a high fever, headache, or nasal discharge.   What to expect   If you follow this treatment plan, you should start to feel better within a few days.   You can return to your normal activities when ALL of the following are true:    You've been fever-free for more than 24 hours without using  fever-reducing medications such as Tylenol    Your other symptoms have improved    It's been at least 10 days since your symptoms first started   When to seek care   Call us at [phone]   with any sudden or unexpected symptoms.    Symptoms that last longer than 10 to 14 days.    Symptoms that get better for a few days, and then suddenly get worse.    Fever that measures over 103F or continues for more than 3 days.    Any vision changes.    Your headache worsens.    Stiff neck.    Swelling of your forehead or eyes.    Coughing up red or bloody mucus.    Swallowing becomes extremely difficult or impossible.    More than 5 episodes of diarrhea in a day.    More than 5 episodes of vomiting in a day.    Severe shortness of breath.    Severe chest pain   Other treatment    Rest! Your body needs rest to recover and fight infection.    Drink plenty of water to stay hydrated.    Use steam to soothe your sinuses: Breathe it in from a shower or a bowl of hot water. Placing a warm, moist washcloth over your nose and forehead may help relieve the sinus pain and pressure.    Try non-prescription saline nasal sprays to help your nasal symptoms. Try using a Neti Pot to flush out your stuffy nose and sinuses. Neti Pots are available at any drugstore without a prescription.    Avoid smoke and air pollution. Smoke can make infections worse.   Prevention    Avoid close contact with other people when you're sick.    Cover your mouth and nose when you cough or sneeze. Use a tissue or cough into your elbow. Make sure that used tissues go directly into the trash.    Avoid touching your eyes, nose, or mouth while you're sick.    Wash your hands often, especially after coughing, sneezing, or blowing your nose. If soap and water are not available, use an alcohol-based hand .    If you or someone in your home or workplace is sick, disinfect commonly used items. This includes door handles, tables, computers, remotes, and  pens.    Coronavirus (COVID-19) information   Common symptoms of COVID-19 include fever, cough, shortness of breath, fatigue, muscle or body aches, headaches, new loss of sense of taste or smell, sore throat, stuffy or runny nose, nausea or vomiting, and diarrhea. Most people who get COVID-19 have mild symptoms and can rest at home until they get better. Elderly people and those with chronic medical problems may be at risk for more serious complications.   FAQs about the COVID-19 vaccine   There are three authorized COVID-19 vaccines: Hai & Hai's Yudy Vaccine (J&J/Yudy), Moderna, and Pfizer-BioNTech (Pfizer). The J&J/Yudy and Moderna vaccines are approved for use in people aged 18 and older. The Pfizer vaccine is approved for those aged 5 and older. All three are available at no cost. Even if you don't have health insurance, you can still get the COVID-19 vaccine for free.   Which vaccine is the best? Which vaccine should I get?   All three vaccines are highly effective. Even if you get COVID after being vaccinated, all of the vaccines help prevent severe disease, hospitalization, and complications.   Most people should get whichever vaccine is first available to them. However, women younger than 50 years old should consider the rare risk of blood clots with low platelets after vaccination with the J&J/Yudy vaccine. This risk hasn't been seen with the other two vaccines.   Are the vaccines safe?   Yes. Hundreds of millions of people in the [place] have already safely received COVID-19 vaccines. As part of Phase 3 clinical trials in the [place] and other countries, researchers collected safety and efficacy data for all three vaccines. These clinical trials follow strict standards. Before a vaccine is approved, the manufacturing company must submit data to the Food and Drug Administration (FDA) for review. Tens of thousands of volunteers participated in the clinical trials for the vaccines. The FDA continues  to monitor safety data as the vaccines are given to the general population.   Do I need the vaccine if I've already had COVID?   Yes. Vaccination helps protect you even if you've already had COVID.   If you had COVID-19 and had symptoms, wait to get vaccinated until ALL of the following are true:    10 days since your symptoms started    24 hours with no fever, without the use of fever-reducing medications    Your other COVID-19 symptoms are improving   If you tested positive for COVID-19 but did not have symptoms, you can get vaccinated after 10 days have passed since you had a positive test, as long as you don't develop symptoms.   If you had COVID-19 and were treated with monoclonal antibodies, you should wait 90 days before getting a COVID-19 vaccination.   How many doses of the vaccine do I need?   J&J/Yudy: one dose.   Moderna: two doses, spaced 4 weeks apart.   Pfizer vaccine: two doses, spaced 3 weeks apart.   When am I considered fully vaccinated?   J&J/Yudy: 14 days after you get the shot.   Moderna: 14 days after your second dose.   Pfizer: 14 days after your second dose.   What if I miss the second dose of the Moderna or Pfizer vaccine?   Contact your healthcare provider to discuss your options. While one dose of the vaccine may provide some protection against COVID, you need both doses for maximum protection.   What are the common side effects of the vaccine?   A sore arm, tiredness, headache, and muscle pain may occur within two days of getting the vaccine and last a day or two. For the Moderna or Pfizer vaccines, side effects are more common after the second dose. People over the age of 55 are less likely to have side effects than younger people.   After I'm fully vaccinated, can I still get or spread COVID?   Yes, but your disease should be milder, and your risk of serious illness, hospitalization, and complications will be much lower. And being vaccinated reduces the risk of spreading the  "disease if you get it.   After I'm fully vaccinated, can I go back to normal?    You should still wear a mask indoors in public if:    It's required by laws, rules, regulations, or local guidance.    You have a weakened immune system.    Your age puts you at increased risk of severe disease.    You have a medical condition that puts you at increased risk of severe disease.    Someone in your household has a weakened immune system, is at increased risk for severe disease, or is unvaccinated.    You're in an area of high transmission.    For travel information, see the CDC's latest guidance  .    Even after you're fully vaccinated, you should still:    Get tested and stay away from others if you develop symptoms of COVID-19.    Stay home and away from other private or public settings if you've tested positive for COVID-19 in the previous 10 days.    Continue to follow any applicable laws, rules, and regulations.    If you're exposed to COVID-19 after being fully vaccinated, you should get tested 3 to 5 days after exposure, even if you don't have symptoms. Wear a mask indoors in public for 14 days following exposure, or until you've confirmed your test result is negative. If you test positive for COVID-19, you should isolate at home for 10 days.   I'm fully vaccinated but have heard about a \"third dose\" and \"fourth dose.\" Do these apply to me?   Third and fourth doses are needed for some immunocompromised people.   You should get a third dose if ALL of the following are true:    You have a moderately to severely weakened immune system    You've had two doses of the Moderna or Pfizer vaccine    It's been at least 28 days since your second dose   You should get a fourth dose if ALL of the following are true:    You have a moderately to severely weakened immune system    You've had three doses of the Moderna or Pfizer vaccine    It's been at least 6 months since your third dose   If any of these situations apply, you have " "a moderately to severely weakened immune system:    You're getting active cancer treatment for a cancer or tumor of the blood    You've had an organ transplant and are taking medicine to suppress your immune system    You've had a stem cell transplant within the last 2 years    You're taking medicine to suppress your immune system, such as high-dose corticosteroids    You have moderate to severe primary immunodeficiency (such as DiGeorge syndrome, Wiskott-Jf syndrome)    You have advanced or untreated HIV infection   If you think you need a third or fourth dose, speak with your care team.   I'm fully vaccinated but have heard about \"boosters.\" Do I need a booster shot?   Everyone 18 and older should get a booster shot. Immunity from vaccinations can decrease over time, and a booster renews the effect of the vaccination.   If you got the J&J/Yudy vaccine AND it's been at least 2 months since your shot, you should get a booster shot now.   If you got the Pfizer or Moderna vaccine AND it's been at least 6 months since your second dose, you should get a booster shot now.   You can choose which vaccine to get as a booster. You can get the kind you originally received, or you can get a different kind. New CDC recommendations allow for mix-and-match dosing for booster shots. However, women younger than 50 years old should consider the rare risk of blood clots with low platelets after vaccination with the J&J/Yudy vaccine. This risk hasn't been seen with the other two vaccines.   If you'd like more information on where and how to get a booster shot, speak with your care team.   General information about COVID-19   What should I do if I'm exposed to someone with COVID-19?   In general, you need to be in close contact with someone who has COVID-19 to get infected. Close contact means:    Living in the same household as someone with COVID-19.    Caring for someone with COVID-19.    Being within 6 feet of someone " with COVID-19 for a total of at least 15 minutes over a 24-hour period.    Being in direct contact with respiratory droplets from someone with COVID-19 (for example, being coughed on, kissing, or sharing utensils).   If you're exposed and not fully vaccinated:    Self-quarantine in your home for 14 days after your last known contact with the infected person. Because you can spread the disease before you have symptoms, it's very important that you stay home AT ALL TIMES, unless you need medical care. Don't go to work, school, or public places, including grocery stores and pharmacies. Avoid public transportation, ride-sharing, and taxis.    Watch for the common symptoms of COVID-19: fever, cough, shortness of breath, fatigue, muscle or body aches, headache, new loss of sense of taste or smell, sore throat, stuffy or runny nose, nausea or vomiting, and diarrhea.   What if I develop symptoms of COVID-19?   CALL your healthcare provider or clinic right away to discuss next steps if you have any of the following risk factors:    Age 65 or older    Pregnant    Chronic medical condition such as diabetes, liver disease, kidney disease requiring dialysis, heart disease, high blood pressure, severe obesity, or lung disease (including moderate to severe asthma)    A medical condition that affects your immune system    Taking a medication that affects your immune system   Otherwise, if your symptoms are mild, you don't need to call your healthcare provider or be seen for an exam. You can recover at home and should feel better within a few weeks. Because COVID-19 is highly contagious, it's important that you avoid close contact with others while you're recovering. This means staying home AT ALL TIMES, unless you need medical care. Don't go to work, school, or public places, including grocery stores and pharmacies. Avoid public transportation, ride-sharing, and taxis.   There are currently no specific medications to treat this  infection. Over-the-counter cold medications can help ease symptoms.   To prevent the spread of COVID-19 to the people and animals in your household:    Stay in a specific room away from other people in your home, and use a separate bathroom if possible.    Wear a mask when close contact with household members can't be avoided.   You can return to your normal activities when ALL of the following are true:    Your symptoms have improved    It's been at least 10 days since your symptoms first started    You've been fever-free for more than 24 hours without using fever-reducing medications such as Tylenol   When to get care   Call your healthcare provider immediately if you have any of the following:    Fever over 103F    Fever that doesn't come down after taking medications such as Tylenol or ibuprofen    Fever that returns after being gone for more than 24 hours    Fever lasting more than 4 days    Worsening shortness of breath or difficulty breathing   Go to your nearest ER or call 911 if you have any of the following:    Shortness of breath that makes it hard to do simple things like get dressed, bathe, or comb your hair    Persistent chest pain or chest tightness    New confusion or difficulty staying alert    Bluish color to the lips or face    Flu vaccine information   Getting a flu vaccine this year is more important than ever. The vaccine not only protects you and the people around you from the flu, it also helps reduce the strain on healthcare systems responding to the COVID-19 pandemic.   Who should get a flu vaccine?   Everyone 6 months of age and older should get a yearly flu vaccine.   When should I get vaccinated?   You should get a flu vaccine by the end of October. Once you're vaccinated, it takes about two weeks for antibodies to develop and protect you against the flu. That's why it's important to get vaccinated as soon as possible.   After October, is it too late to get vaccinated?   No. You should  still get vaccinated. As long as the flu viruses are still in your community, flu vaccines will remain available, even into January of next year or later.   Why do I need a flu vaccine EVERY year?   Flu viruses are constantly changing, so flu vaccines are usually updated from one season to the next. Your protection from the flu vaccine also lessens over time.   Is the flu vaccine safe?   Yes. Over the last 50 years, hundreds of millions of Americans have safely received the flu vaccines.   What are the side effects of flu vaccines?   You CANNOT get the flu from a flu vaccine. Common side effects of the flu shot include soreness, redness and/or swelling where the shot was given, low grade fever, and aches. Common side effects of the nasal spray flu vaccine for adults include runny nose, headaches, sore throat, and cough. For children, side effects include wheezing, vomiting, muscle aches, and fever.   Does the flu vaccine increase your risk of getting COVID-19?   No. There is no evidence that getting a flu vaccine increases your risk of getting COVID-19.   Is it safe to get the flu vaccine along with a COVID-19 vaccine?   Yes. It's safe to get the flu vaccine with a COVID vaccine or booster.   Contact your healthcare provider TODAY for details on when and where to get your flu vaccine.   Your provider   Your diagnosis was provided by Shae Garcia, a member of your trusted care team at [facility].   If you have any questions, call us at [phone]  .

## 2023-09-11 ENCOUNTER — OFFICE VISIT (OUTPATIENT)
Dept: INTERNAL MEDICINE | Facility: CLINIC | Age: 53
End: 2023-09-11
Payer: COMMERCIAL

## 2023-09-11 VITALS
HEART RATE: 90 BPM | DIASTOLIC BLOOD PRESSURE: 84 MMHG | BODY MASS INDEX: 30.3 KG/M2 | SYSTOLIC BLOOD PRESSURE: 118 MMHG | TEMPERATURE: 97.8 F | HEIGHT: 71 IN | WEIGHT: 216.4 LBS | OXYGEN SATURATION: 96 %

## 2023-09-11 DIAGNOSIS — R63.1 POLYDIPSIA: ICD-10-CM

## 2023-09-11 DIAGNOSIS — Z00.00 HEALTHCARE MAINTENANCE: Primary | ICD-10-CM

## 2023-09-11 DIAGNOSIS — E66.9 OBESITY (BMI 30.0-34.9): ICD-10-CM

## 2023-09-11 DIAGNOSIS — E11.43 TYPE 2 DIABETES MELLITUS WITH DIABETIC AUTONOMIC NEUROPATHY, WITHOUT LONG-TERM CURRENT USE OF INSULIN: ICD-10-CM

## 2023-09-11 NOTE — ASSESSMENT & PLAN NOTE
Reports of 1 week of excessive water intake and polyuria.  Has had associated visual disturbances that were ultimately negative per his ophthalmologist for any retinal detachment or concerning findings.  I explained to him that typically visual disturbances and polydipsia are more of a gradual presentation in patients with diabetes however it absolutely still is possible.  We will order a A1c, CMP, lipid panel, CBC for further evaluation.  Ultimately if these are all negative we likely should do further work-up for other causes of polydipsia

## 2023-09-11 NOTE — ASSESSMENT & PLAN NOTE
Colonoscopy: due, ordered  LDCT: never smoker  AAA: never smoker    Immunizations: eligible for Zoster, declined today.  Labs: ordered today CBC, CMP, A1c, lipid, Hep C screening  BP at goal <130/80  Will consider PSA at 56yo given family hx of prostate cancer

## 2023-09-11 NOTE — ASSESSMENT & PLAN NOTE
Patient's (Body mass index is 30.18 kg/m².) indicates that they are obese (BMI >30) with health conditions that include none . Weight is improving with lifestyle modifications. BMI  is above average; no BMI management plan is appropriate. We discussed low calorie, low carb based diet program, portion control, and increasing exercise.

## 2023-09-11 NOTE — PROGRESS NOTES
"  New Patient Office Visit      Patient Name: Damian Valles III  : 1970   MRN: [id number]   Care Team: Patient Care Team:  Myriam Ambriz APRN as PCP - General (Internal Medicine)    Chief Complaint:    Chief Complaint   Patient presents with    Annual Exam    Prediabetes       History of Present Illness: Damian Valles III is a 53 y.o. male who is here today to establish care.      He states he has not seen a physician in 6 or 7 years since his previous one retired.  He presents with primary complaints of polydipsia.  He states early September he has been drinking significantly more water than normal which has been causing him to have to go to the bathroom much more.  He was at the [place] football game on  and reports of an episode of blurry vision that resolved spontaneously.  He went to the eye doctor this past Saturday and reports there were no concerning findings per his eye doctor, however was told to evaluate with the PCP due to concern for diabetes.  He states that he does not necessarily feel as if he is thirsty or dehydrated, just that drinking water \"tastes good\". He denies any numbness or tingling in his extremities, denies any residual visual disturbances, denies any nausea vomiting, constipation.      He does report that he has lost 15 pounds over the past 2 weeks however this was intentional with lifestyle changes.    He has a history of sinus infections but otherwise no significant medical history.  Only notable surgical history is a tonsillectomy as a child.  Family history is significant for prostate cancer in his father who had a resection and is living without needing further treatment.    He reports being a social drinker and does use dipping tobacco but denies any cigarette smoking.  He works in IT at [employer]      Subjective      Review of Systems:   Review of Systems   Constitutional:  Negative for chills, fever and unexpected weight loss.   Eyes:  " Positive for blurred vision.        As noted in HPI   Respiratory:  Negative for cough, chest tightness and shortness of breath.    Cardiovascular:  Negative for chest pain and leg swelling.   Gastrointestinal:  Negative for abdominal pain, diarrhea, nausea and vomiting.   Endocrine: Positive for polydipsia and polyuria. Negative for polyphagia.   Genitourinary:  Negative for dysuria, hematuria and urgency.   Skin:  Negative for rash, skin lesions and wound.   Neurological:  Negative for dizziness, syncope, speech difficulty, weakness and headache.   Psychiatric/Behavioral:  Negative for depressed mood and stress. The patient is not nervous/anxious.   - See HPI    Past Medical History:   Past Medical History:   Diagnosis Date    Blurred vision     Excessive thirst     Frequent urination        Past Surgical History:   Past Surgical History:   Procedure Laterality Date    ORAL MUCOCELE EXCISION      TONSILLECTOMY         Family History:   Family History   Problem Relation Age of Onset    Hypertension Mother         I believe it’s controlled or controlled by medication    Thyroid disease Mother     Prostate cancer Father     Cancer Father         Prostate Cancer; prostate removed; cancer free    Heart disease Paternal Grandfather         Paternal grandfather passed from heart attack in 1960       Social History:   Social History     Socioeconomic History    Marital status:      Spouse name: Chevy    Number of children: 2   Tobacco Use    Smoking status: Former     Types: Cigarettes     Passive exposure: Past    Smokeless tobacco: Current     Types: Chew   Substance and Sexual Activity    Alcohol use: Yes     Alcohol/week: 0.0 - 5.0 standard drinks     Comment: I control my alcohol intake very closely.  Socially drink    Drug use: No    Sexual activity: Not Currently     Partners: Female     Birth control/protection: Condom       Tobacco History:   Social History     Tobacco Use   Smoking Status Former    Types:  "Cigarettes    Passive exposure: Past   Smokeless Tobacco Current    Types: Chew       Medications:     Current Outpatient Medications:     Azelastine-Fluticasone (DYMISTA) 137-50 MCG/ACT suspension, 2 sprays into each nostril 2 (Two) Times a Day., Disp: 1 bottle, Rfl: 5    Allergies:   No Known Allergies    Objective     Physical Exam:  Vital Signs:   Vitals:    09/11/23 1429   BP: 118/84   Pulse: 90   Temp: 97.8 °F (36.6 °C)   TempSrc: Temporal   SpO2: 96%   Weight: 98.2 kg (216 lb 6.4 oz)   Height: 180.3 cm (71\")     Body mass index is 30.18 kg/m².     Physical Exam  Vitals reviewed.   Constitutional:       General: He is not in acute distress.     Appearance: Normal appearance.   HENT:      Head: Normocephalic and atraumatic.      Right Ear: External ear normal.      Left Ear: External ear normal.      Nose: Nose normal. No rhinorrhea.      Mouth/Throat:      Mouth: Mucous membranes are moist.      Pharynx: Oropharynx is clear.   Eyes:      General:         Right eye: No discharge.         Left eye: No discharge.      Extraocular Movements: Extraocular movements intact.      Conjunctiva/sclera: Conjunctivae normal.   Cardiovascular:      Rate and Rhythm: Normal rate and regular rhythm.      Heart sounds: Normal heart sounds.   Pulmonary:      Effort: Pulmonary effort is normal. No respiratory distress.      Breath sounds: Normal breath sounds.   Abdominal:      General: Abdomen is flat. There is no distension.      Palpations: Abdomen is soft.   Musculoskeletal:         General: No swelling or deformity. Normal range of motion.      Cervical back: Normal range of motion and neck supple.   Skin:     General: Skin is warm and dry.   Neurological:      General: No focal deficit present.      Mental Status: He is alert and oriented to person, place, and time. Mental status is at baseline.   Psychiatric:         Mood and Affect: Mood normal.         Behavior: Behavior normal.       Assessment / Plan  "     Assessment/Plan:   Problems Addressed This Visit  Diagnoses and all orders for this visit:    1. Healthcare maintenance (Primary)  Assessment & Plan:  Colonoscopy: due, ordered  LDCT: never smoker  AAA: never smoker    Immunizations: eligible for Zoster, declined today.  Labs: ordered today CBC, CMP, A1c, lipid, Hep C screening  BP at goal <130/80  Will consider PSA at 56yo given family hx of prostate cancer        Orders:  -     Ambulatory Referral For Screening Colonoscopy  -     Cancel: Lipid panel; Future  -     Lipid panel  -     Hepatitis C antibody    2. Polydipsia  Assessment & Plan:  Reports of 1 week of excessive water intake and polyuria.  Has had associated visual disturbances that were ultimately negative per his ophthalmologist for any retinal detachment or concerning findings.  I explained to him that typically visual disturbances and polydipsia are more of a gradual presentation in patients with diabetes however it absolutely still is possible.  We will order a A1c, CMP, lipid panel, CBC for further evaluation.  Ultimately if these are all negative we likely should do further work-up for other causes of polydipsia    Orders:  -     Cancel: CBC w AUTO Differential; Future  -     Cancel: Comprehensive metabolic panel; Future  -     Cancel: Hemoglobin A1c; Future  -     CBC w AUTO Differential  -     Comprehensive metabolic panel  -     Hemoglobin A1c    3. Obesity (BMI 30.0-34.9)  Assessment & Plan:  Patient's (Body mass index is 30.18 kg/m².) indicates that they are obese (BMI >30) with health conditions that include none . Weight is improving with lifestyle modifications. BMI  is above average; no BMI management plan is appropriate. We discussed low calorie, low carb based diet program, portion control, and increasing exercise.             *ADDENDUM  - Given A1c result of 12.2%, will initiate Metformin for tighter glycemic control. Will start Metformin 500mg and titrate up with goal of 1000mg  BID. Counseled patient on possible adverse side effects and titration steps. Will likely need to initiate second medication however will plan to see at next visit to discuss.     Plan of care reviewed with patient at the conclusion of today's visit. Education was provided regarding diagnosis and management.  Patient verbalizes understanding of and agreement with management plan.      Follow Up:   Return in about 3 months (around 12/11/2023).          [facility]  [address] [place] [phone]  Fax [phone]

## 2023-09-12 ENCOUNTER — PATIENT ROUNDING (BHMG ONLY) (OUTPATIENT)
Dept: INTERNAL MEDICINE | Facility: CLINIC | Age: 53
End: 2023-09-12
Payer: COMMERCIAL

## 2023-09-12 ENCOUNTER — TELEPHONE (OUTPATIENT)
Dept: INTERNAL MEDICINE | Facility: CLINIC | Age: 53
End: 2023-09-12
Payer: COMMERCIAL

## 2023-09-12 LAB
ALBUMIN SERPL-MCNC: 5.1 G/DL (ref 3.5–5.2)
ALBUMIN/GLOB SERPL: 2 G/DL
ALP SERPL-CCNC: 112 U/L (ref 39–117)
ALT SERPL-CCNC: 40 U/L (ref 1–41)
AST SERPL-CCNC: 21 U/L (ref 1–40)
BASOPHILS # BLD AUTO: 0.04 10*3/MM3 (ref 0–0.2)
BASOPHILS NFR BLD AUTO: 0.6 % (ref 0–1.5)
BILIRUB SERPL-MCNC: 1.3 MG/DL (ref 0–1.2)
BUN SERPL-MCNC: 13 MG/DL (ref 6–20)
BUN/CREAT SERPL: 12.1 (ref 7–25)
CALCIUM SERPL-MCNC: 10 MG/DL (ref 8.6–10.5)
CHLORIDE SERPL-SCNC: 97 MMOL/L (ref 98–107)
CHOLEST SERPL-MCNC: 216 MG/DL (ref 0–200)
CO2 SERPL-SCNC: 21.9 MMOL/L (ref 22–29)
CREAT SERPL-MCNC: 1.07 MG/DL (ref 0.76–1.27)
EGFRCR SERPLBLD CKD-EPI 2021: 83 ML/MIN/1.73
EOSINOPHIL # BLD AUTO: 0.06 10*3/MM3 (ref 0–0.4)
EOSINOPHIL NFR BLD AUTO: 0.9 % (ref 0.3–6.2)
ERYTHROCYTE [DISTWIDTH] IN BLOOD BY AUTOMATED COUNT: 12.2 % (ref 12.3–15.4)
GLOBULIN SER CALC-MCNC: 2.5 GM/DL
GLUCOSE SERPL-MCNC: 347 MG/DL (ref 65–99)
HBA1C MFR BLD: 12.2 % (ref 4.8–5.6)
HCT VFR BLD AUTO: 51.1 % (ref 37.5–51)
HCV IGG SERPL QL IA: NON REACTIVE
HDLC SERPL-MCNC: 28 MG/DL (ref 40–60)
HGB BLD-MCNC: 17.1 G/DL (ref 13–17.7)
IMM GRANULOCYTES # BLD AUTO: 0.02 10*3/MM3 (ref 0–0.05)
IMM GRANULOCYTES NFR BLD AUTO: 0.3 % (ref 0–0.5)
LDLC SERPL CALC-MCNC: 121 MG/DL (ref 0–100)
LYMPHOCYTES # BLD AUTO: 2.52 10*3/MM3 (ref 0.7–3.1)
LYMPHOCYTES NFR BLD AUTO: 37.3 % (ref 19.6–45.3)
MCH RBC QN AUTO: 30.1 PG (ref 26.6–33)
MCHC RBC AUTO-ENTMCNC: 33.5 G/DL (ref 31.5–35.7)
MCV RBC AUTO: 90 FL (ref 79–97)
MONOCYTES # BLD AUTO: 0.39 10*3/MM3 (ref 0.1–0.9)
MONOCYTES NFR BLD AUTO: 5.8 % (ref 5–12)
NEUTROPHILS # BLD AUTO: 3.73 10*3/MM3 (ref 1.7–7)
NEUTROPHILS NFR BLD AUTO: 55.1 % (ref 42.7–76)
NRBC BLD AUTO-RTO: 0 /100 WBC (ref 0–0.2)
PLATELET # BLD AUTO: 197 10*3/MM3 (ref 140–450)
POTASSIUM SERPL-SCNC: 4.4 MMOL/L (ref 3.5–5.2)
PROT SERPL-MCNC: 7.6 G/DL (ref 6–8.5)
RBC # BLD AUTO: 5.68 10*6/MM3 (ref 4.14–5.8)
SODIUM SERPL-SCNC: 138 MMOL/L (ref 136–145)
TRIGL SERPL-MCNC: 377 MG/DL (ref 0–150)
VLDLC SERPL CALC-MCNC: 67 MG/DL (ref 5–40)
WBC # BLD AUTO: 6.76 10*3/MM3 (ref 3.4–10.8)

## 2023-09-12 NOTE — PROGRESS NOTES
September 11, 2023    Hello, may I speak with Damian Valles III?    My name is Jp      I am  with [facility]  [address]  [place] [id number].    Before we get started may I verify your date of birth? 1970    I am calling to officially welcome you to our practice and ask about your recent visit. Is this a good time to talk? yes    Tell me about your visit with us. What things went well?  everything       We're always looking for ways to make our patients' experiences even better. Do you have recommendations on ways we may improve?  no    Overall were you satisfied with your first visit to our practice? yes       I appreciate you taking the time to speak with me today. Is there anything else I can do for you? no      Thank you, and have a great day.

## 2023-09-15 ENCOUNTER — APPOINTMENT (OUTPATIENT)
Dept: CT IMAGING | Facility: HOSPITAL | Age: 53
End: 2023-09-15
Payer: COMMERCIAL

## 2023-09-15 ENCOUNTER — APPOINTMENT (OUTPATIENT)
Dept: CARDIOLOGY | Facility: HOSPITAL | Age: 53
End: 2023-09-15
Payer: COMMERCIAL

## 2023-09-15 ENCOUNTER — HOSPITAL ENCOUNTER (OUTPATIENT)
Facility: HOSPITAL | Age: 53
Setting detail: OBSERVATION
Discharge: HOME | End: 2023-09-16
Attending: EMERGENCY MEDICINE | Admitting: EMERGENCY MEDICINE
Payer: COMMERCIAL

## 2023-09-15 ENCOUNTER — TELEPHONE (OUTPATIENT)
Dept: INTERNAL MEDICINE | Facility: CLINIC | Age: 53
End: 2023-09-15

## 2023-09-15 ENCOUNTER — APPOINTMENT (OUTPATIENT)
Dept: GENERAL RADIOLOGY | Facility: HOSPITAL | Age: 53
End: 2023-09-15
Payer: COMMERCIAL

## 2023-09-15 ENCOUNTER — APPOINTMENT (OUTPATIENT)
Dept: NEUROLOGY | Facility: HOSPITAL | Age: 53
End: 2023-09-15
Payer: COMMERCIAL

## 2023-09-15 ENCOUNTER — APPOINTMENT (OUTPATIENT)
Dept: MRI IMAGING | Facility: HOSPITAL | Age: 53
End: 2023-09-15
Payer: COMMERCIAL

## 2023-09-15 DIAGNOSIS — G45.9 TIA (TRANSIENT ISCHEMIC ATTACK): Primary | ICD-10-CM

## 2023-09-15 LAB
ABO GROUP BLD: NORMAL
ALBUMIN SERPL-MCNC: 4.4 G/DL (ref 3.5–5.2)
ALBUMIN/GLOB SERPL: 1.9 G/DL
ALP SERPL-CCNC: 76 U/L (ref 39–117)
ALT SERPL W P-5'-P-CCNC: 40 U/L (ref 1–41)
ANION GAP SERPL CALCULATED.3IONS-SCNC: 13.1 MMOL/L (ref 5–15)
AORTIC DIMENSIONLESS INDEX: 0.9 (DI)
APTT PPP: 26.5 SECONDS (ref 22.7–35.4)
ASCENDING AORTA: 3.2 CM
AST SERPL-CCNC: 22 U/L (ref 1–40)
BASOPHILS # BLD AUTO: 0.02 10*3/MM3 (ref 0–0.2)
BASOPHILS NFR BLD AUTO: 0.3 % (ref 0–1.5)
BH CV ECHO MEAS - ACS: 2.7 CM
BH CV ECHO MEAS - AO MAX PG: 4.9 MMHG
BH CV ECHO MEAS - AO MEAN PG: 2.9 MMHG
BH CV ECHO MEAS - AO ROOT DIAM: 3.6 CM
BH CV ECHO MEAS - AO V2 MAX: 110.3 CM/SEC
BH CV ECHO MEAS - AO V2 VTI: 22.2 CM
BH CV ECHO MEAS - AVA(I,D): 3.1 CM2
BH CV ECHO MEAS - EDV(CUBED): 63.7 ML
BH CV ECHO MEAS - EDV(MOD-SP2): 98 ML
BH CV ECHO MEAS - EDV(MOD-SP4): 110 ML
BH CV ECHO MEAS - EF(MOD-BP): 62.9 %
BH CV ECHO MEAS - EF(MOD-SP2): 64.3 %
BH CV ECHO MEAS - EF(MOD-SP4): 61.8 %
BH CV ECHO MEAS - ESV(CUBED): 15.1 ML
BH CV ECHO MEAS - ESV(MOD-SP2): 35 ML
BH CV ECHO MEAS - ESV(MOD-SP4): 42 ML
BH CV ECHO MEAS - FS: 38.1 %
BH CV ECHO MEAS - IVS/LVPW: 1.02 CM
BH CV ECHO MEAS - IVSD: 1.1 CM
BH CV ECHO MEAS - LAT PEAK E' VEL: 12.2 CM/SEC
BH CV ECHO MEAS - LV MASS(C)D: 143 GRAMS
BH CV ECHO MEAS - LV MAX PG: 3.8 MMHG
BH CV ECHO MEAS - LV MEAN PG: 1.82 MMHG
BH CV ECHO MEAS - LV V1 MAX: 96.8 CM/SEC
BH CV ECHO MEAS - LV V1 VTI: 20.3 CM
BH CV ECHO MEAS - LVIDD: 4 CM
BH CV ECHO MEAS - LVIDS: 2.47 CM
BH CV ECHO MEAS - LVOT AREA: 3.4 CM2
BH CV ECHO MEAS - LVOT DIAM: 2.07 CM
BH CV ECHO MEAS - LVPWD: 1.08 CM
BH CV ECHO MEAS - MED PEAK E' VEL: 10.7 CM/SEC
BH CV ECHO MEAS - MV A DUR: 0.14 SEC
BH CV ECHO MEAS - MV A MAX VEL: 65.1 CM/SEC
BH CV ECHO MEAS - MV DEC SLOPE: 350.8 CM/SEC2
BH CV ECHO MEAS - MV DEC TIME: 0.17 MSEC
BH CV ECHO MEAS - MV E MAX VEL: 64.9 CM/SEC
BH CV ECHO MEAS - MV E/A: 1
BH CV ECHO MEAS - MV MAX PG: 2.7 MMHG
BH CV ECHO MEAS - MV MEAN PG: 0.82 MMHG
BH CV ECHO MEAS - MV P1/2T: 68.2 MSEC
BH CV ECHO MEAS - MV V2 VTI: 26.7 CM
BH CV ECHO MEAS - MVA(P1/2T): 3.2 CM2
BH CV ECHO MEAS - MVA(VTI): 2.6 CM2
BH CV ECHO MEAS - PA ACC TIME: 0.11 SEC
BH CV ECHO MEAS - PA V2 MAX: 95 CM/SEC
BH CV ECHO MEAS - PI END-D VEL: 100.4 CM/SEC
BH CV ECHO MEAS - PULM A REVS DUR: 0.1 SEC
BH CV ECHO MEAS - PULM A REVS VEL: 29.5 CM/SEC
BH CV ECHO MEAS - PULM DIAS VEL: 23.3 CM/SEC
BH CV ECHO MEAS - PULM S/D: 1.41
BH CV ECHO MEAS - PULM SYS VEL: 32.8 CM/SEC
BH CV ECHO MEAS - QP/QS: 0.53
BH CV ECHO MEAS - RAP SYSTOLE: 8 MMHG
BH CV ECHO MEAS - RV MAX PG: 1.13 MMHG
BH CV ECHO MEAS - RV V1 MAX: 53.1 CM/SEC
BH CV ECHO MEAS - RV V1 VTI: 12.7 CM
BH CV ECHO MEAS - RVOT DIAM: 1.92 CM
BH CV ECHO MEAS - RVSP: 18 MMHG
BH CV ECHO MEAS - SV(LVOT): 68.2 ML
BH CV ECHO MEAS - SV(MOD-SP2): 63 ML
BH CV ECHO MEAS - SV(MOD-SP4): 68 ML
BH CV ECHO MEAS - SV(RVOT): 36.5 ML
BH CV ECHO MEAS - TR MAX PG: 9.7 MMHG
BH CV ECHO MEAS - TR MAX VEL: 156.1 CM/SEC
BH CV ECHO MEASUREMENTS AVERAGE E/E' RATIO: 5.67
BH CV ECHO SHUNT ASSESSMENT PERFORMED (HIDDEN SCRIPTING): 1
BH CV XLRA - RV BASE: 2.6 CM
BH CV XLRA - RV LENGTH: 7.5 CM
BH CV XLRA - RV MID: 4.1 CM
BH CV XLRA - TDI S': 15.8 CM/SEC
BILIRUB SERPL-MCNC: 1.1 MG/DL (ref 0–1.2)
BLD GP AB SCN SERPL QL: NEGATIVE
BUN SERPL-MCNC: 15 MG/DL (ref 6–20)
BUN/CREAT SERPL: 14.7 (ref 7–25)
CALCIUM SPEC-SCNC: 9.7 MG/DL (ref 8.6–10.5)
CHLORIDE SERPL-SCNC: 102 MMOL/L (ref 98–107)
CHOLEST SERPL-MCNC: 199 MG/DL (ref 0–200)
CO2 SERPL-SCNC: 21.9 MMOL/L (ref 22–29)
CREAT SERPL-MCNC: 1.02 MG/DL (ref 0.76–1.27)
DEPRECATED RDW RBC AUTO: 38.2 FL (ref 37–54)
EGFRCR SERPLBLD CKD-EPI 2021: 87.9 ML/MIN/1.73
EOSINOPHIL # BLD AUTO: 0.06 10*3/MM3 (ref 0–0.4)
EOSINOPHIL NFR BLD AUTO: 1 % (ref 0.3–6.2)
ERYTHROCYTE [DISTWIDTH] IN BLOOD BY AUTOMATED COUNT: 12.1 % (ref 12.3–15.4)
GLOBULIN UR ELPH-MCNC: 2.3 GM/DL
GLUCOSE BLDC GLUCOMTR-MCNC: 106 MG/DL (ref 70–130)
GLUCOSE BLDC GLUCOMTR-MCNC: 205 MG/DL (ref 70–130)
GLUCOSE BLDC GLUCOMTR-MCNC: 270 MG/DL (ref 70–130)
GLUCOSE SERPL-MCNC: 222 MG/DL (ref 65–99)
HBA1C MFR BLD: 12.4 % (ref 4.8–5.6)
HCT VFR BLD AUTO: 44.1 % (ref 37.5–51)
HDLC SERPL-MCNC: 25 MG/DL (ref 40–60)
HGB BLD-MCNC: 15.3 G/DL (ref 13–17.7)
HOLD SPECIMEN: NORMAL
IMM GRANULOCYTES # BLD AUTO: 0.02 10*3/MM3 (ref 0–0.05)
IMM GRANULOCYTES NFR BLD AUTO: 0.3 % (ref 0–0.5)
INR PPP: 1.04 (ref 0.9–1.1)
INR PPP: 1.06 (ref 0.9–1.1)
LDLC SERPL CALC-MCNC: 139 MG/DL (ref 0–100)
LDLC/HDLC SERPL: 5.42 {RATIO}
LEFT ATRIUM VOLUME INDEX: 20.2 ML/M2
LYMPHOCYTES # BLD AUTO: 1.83 10*3/MM3 (ref 0.7–3.1)
LYMPHOCYTES NFR BLD AUTO: 30.4 % (ref 19.6–45.3)
MCH RBC QN AUTO: 30.4 PG (ref 26.6–33)
MCHC RBC AUTO-ENTMCNC: 34.7 G/DL (ref 31.5–35.7)
MCV RBC AUTO: 87.7 FL (ref 79–97)
MONOCYTES # BLD AUTO: 0.41 10*3/MM3 (ref 0.1–0.9)
MONOCYTES NFR BLD AUTO: 6.8 % (ref 5–12)
NEUTROPHILS NFR BLD AUTO: 3.68 10*3/MM3 (ref 1.7–7)
NEUTROPHILS NFR BLD AUTO: 61.2 % (ref 42.7–76)
NRBC BLD AUTO-RTO: 0 /100 WBC (ref 0–0.2)
PLATELET # BLD AUTO: 174 10*3/MM3 (ref 140–450)
PMV BLD AUTO: 11 FL (ref 6–12)
POTASSIUM SERPL-SCNC: 4.2 MMOL/L (ref 3.5–5.2)
PROT SERPL-MCNC: 6.7 G/DL (ref 6–8.5)
PROTHROMBIN TIME: 13.8 SECONDS (ref 11.7–14.2)
PROTHROMBIN TIME: 13.9 SECONDS (ref 11.7–14.2)
QT INTERVAL: 376 MS
QTC INTERVAL: 388 MS
RBC # BLD AUTO: 5.03 10*6/MM3 (ref 4.14–5.8)
RH BLD: NEGATIVE
SINUS: 3.3 CM
SODIUM SERPL-SCNC: 137 MMOL/L (ref 136–145)
STJ: 2.6 CM
T&S EXPIRATION DATE: NORMAL
TRIGL SERPL-MCNC: 192 MG/DL (ref 0–150)
TSH SERPL DL<=0.05 MIU/L-ACNC: 0.89 UIU/ML (ref 0.27–4.2)
VIT B12 BLD-MCNC: 455 PG/ML (ref 211–946)
VLDLC SERPL-MCNC: 35 MG/DL (ref 5–40)
WBC NRBC COR # BLD: 6.02 10*3/MM3 (ref 3.4–10.8)
WHOLE BLOOD HOLD COAG: NORMAL
WHOLE BLOOD HOLD SPECIMEN: NORMAL

## 2023-09-15 PROCEDURE — 85610 PROTHROMBIN TIME: CPT | Performed by: NURSE PRACTITIONER

## 2023-09-15 PROCEDURE — 70450 CT HEAD/BRAIN W/O DYE: CPT

## 2023-09-15 PROCEDURE — 86850 RBC ANTIBODY SCREEN: CPT | Performed by: EMERGENCY MEDICINE

## 2023-09-15 PROCEDURE — 93306 TTE W/DOPPLER COMPLETE: CPT | Performed by: INTERNAL MEDICINE

## 2023-09-15 PROCEDURE — 0 GADOBENATE DIMEGLUMINE 529 MG/ML SOLUTION: Performed by: EMERGENCY MEDICINE

## 2023-09-15 PROCEDURE — 70496 CT ANGIOGRAPHY HEAD: CPT

## 2023-09-15 PROCEDURE — 71045 X-RAY EXAM CHEST 1 VIEW: CPT

## 2023-09-15 PROCEDURE — 70498 CT ANGIOGRAPHY NECK: CPT

## 2023-09-15 PROCEDURE — 93005 ELECTROCARDIOGRAM TRACING: CPT | Performed by: EMERGENCY MEDICINE

## 2023-09-15 PROCEDURE — 25510000001 PERFLUTREN (DEFINITY) 8.476 MG IN SODIUM CHLORIDE (PF) 0.9 % 10 ML INJECTION: Performed by: NURSE PRACTITIONER

## 2023-09-15 PROCEDURE — 25010000002 DIPHENHYDRAMINE PER 50 MG: Performed by: NURSE PRACTITIONER

## 2023-09-15 PROCEDURE — G0378 HOSPITAL OBSERVATION PER HR: HCPCS

## 2023-09-15 PROCEDURE — 83036 HEMOGLOBIN GLYCOSYLATED A1C: CPT | Performed by: NURSE PRACTITIONER

## 2023-09-15 PROCEDURE — 70553 MRI BRAIN STEM W/O & W/DYE: CPT

## 2023-09-15 PROCEDURE — 93306 TTE W/DOPPLER COMPLETE: CPT

## 2023-09-15 PROCEDURE — 85610 PROTHROMBIN TIME: CPT | Performed by: EMERGENCY MEDICINE

## 2023-09-15 PROCEDURE — 80061 LIPID PANEL: CPT | Performed by: NURSE PRACTITIONER

## 2023-09-15 PROCEDURE — 80050 GENERAL HEALTH PANEL: CPT | Performed by: EMERGENCY MEDICINE

## 2023-09-15 PROCEDURE — 93010 ELECTROCARDIOGRAM REPORT: CPT | Performed by: INTERNAL MEDICINE

## 2023-09-15 PROCEDURE — 82948 REAGENT STRIP/BLOOD GLUCOSE: CPT

## 2023-09-15 PROCEDURE — 96374 THER/PROPH/DIAG INJ IV PUSH: CPT

## 2023-09-15 PROCEDURE — 25010000002 PROCHLORPERAZINE 10 MG/2ML SOLUTION: Performed by: NURSE PRACTITIONER

## 2023-09-15 PROCEDURE — 99284 EMERGENCY DEPT VISIT MOD MDM: CPT

## 2023-09-15 PROCEDURE — 95819 EEG AWAKE AND ASLEEP: CPT

## 2023-09-15 PROCEDURE — 25510000001 IOPAMIDOL PER 1 ML: Performed by: EMERGENCY MEDICINE

## 2023-09-15 PROCEDURE — 63710000001 INSULIN LISPRO (HUMAN) PER 5 UNITS: Performed by: NURSE PRACTITIONER

## 2023-09-15 PROCEDURE — 96375 TX/PRO/DX INJ NEW DRUG ADDON: CPT

## 2023-09-15 PROCEDURE — 86901 BLOOD TYPING SEROLOGIC RH(D): CPT | Performed by: EMERGENCY MEDICINE

## 2023-09-15 PROCEDURE — 86900 BLOOD TYPING SEROLOGIC ABO: CPT | Performed by: EMERGENCY MEDICINE

## 2023-09-15 PROCEDURE — 95819 EEG AWAKE AND ASLEEP: CPT | Performed by: STUDENT IN AN ORGANIZED HEALTH CARE EDUCATION/TRAINING PROGRAM

## 2023-09-15 PROCEDURE — 85730 THROMBOPLASTIN TIME PARTIAL: CPT | Performed by: NURSE PRACTITIONER

## 2023-09-15 PROCEDURE — A9577 INJ MULTIHANCE: HCPCS | Performed by: EMERGENCY MEDICINE

## 2023-09-15 PROCEDURE — 82607 VITAMIN B-12: CPT | Performed by: NURSE PRACTITIONER

## 2023-09-15 PROCEDURE — 99204 OFFICE O/P NEW MOD 45 MIN: CPT | Performed by: STUDENT IN AN ORGANIZED HEALTH CARE EDUCATION/TRAINING PROGRAM

## 2023-09-15 RX ORDER — ATORVASTATIN CALCIUM 80 MG/1
80 TABLET, FILM COATED ORAL NIGHTLY
Status: DISCONTINUED | OUTPATIENT
Start: 2023-09-15 | End: 2023-09-16 | Stop reason: HOSPADM

## 2023-09-15 RX ORDER — DIPHENHYDRAMINE HYDROCHLORIDE 50 MG/ML
25 INJECTION INTRAMUSCULAR; INTRAVENOUS ONCE
Status: COMPLETED | OUTPATIENT
Start: 2023-09-15 | End: 2023-09-15

## 2023-09-15 RX ORDER — IBUPROFEN 600 MG/1
1 TABLET ORAL
Status: DISCONTINUED | OUTPATIENT
Start: 2023-09-15 | End: 2023-09-16 | Stop reason: HOSPADM

## 2023-09-15 RX ORDER — ONDANSETRON 2 MG/ML
4 INJECTION INTRAMUSCULAR; INTRAVENOUS EVERY 6 HOURS PRN
Status: DISCONTINUED | OUTPATIENT
Start: 2023-09-15 | End: 2023-09-16 | Stop reason: HOSPADM

## 2023-09-15 RX ORDER — ASPIRIN 325 MG
325 TABLET ORAL DAILY
Status: DISCONTINUED | OUTPATIENT
Start: 2023-09-15 | End: 2023-09-16

## 2023-09-15 RX ORDER — SODIUM CHLORIDE 0.9 % (FLUSH) 0.9 %
10 SYRINGE (ML) INJECTION AS NEEDED
Status: DISCONTINUED | OUTPATIENT
Start: 2023-09-15 | End: 2023-09-16 | Stop reason: HOSPADM

## 2023-09-15 RX ORDER — ATORVASTATIN CALCIUM 20 MG/1
40 TABLET, FILM COATED ORAL NIGHTLY
Status: DISCONTINUED | OUTPATIENT
Start: 2023-09-15 | End: 2023-09-15

## 2023-09-15 RX ORDER — DEXTROSE MONOHYDRATE 25 G/50ML
25 INJECTION, SOLUTION INTRAVENOUS
Status: DISCONTINUED | OUTPATIENT
Start: 2023-09-15 | End: 2023-09-16 | Stop reason: HOSPADM

## 2023-09-15 RX ORDER — PROCHLORPERAZINE EDISYLATE 5 MG/ML
10 INJECTION INTRAMUSCULAR; INTRAVENOUS ONCE
Status: COMPLETED | OUTPATIENT
Start: 2023-09-15 | End: 2023-09-15

## 2023-09-15 RX ORDER — ASPIRIN 81 MG/1
324 TABLET, CHEWABLE ORAL ONCE
Status: DISCONTINUED | OUTPATIENT
Start: 2023-09-15 | End: 2023-09-15

## 2023-09-15 RX ORDER — SODIUM CHLORIDE 9 MG/ML
40 INJECTION, SOLUTION INTRAVENOUS AS NEEDED
Status: DISCONTINUED | OUTPATIENT
Start: 2023-09-15 | End: 2023-09-16 | Stop reason: HOSPADM

## 2023-09-15 RX ORDER — INSULIN LISPRO 100 [IU]/ML
2-9 INJECTION, SOLUTION INTRAVENOUS; SUBCUTANEOUS
Status: DISCONTINUED | OUTPATIENT
Start: 2023-09-15 | End: 2023-09-16 | Stop reason: HOSPADM

## 2023-09-15 RX ORDER — ASPIRIN 300 MG/1
300 SUPPOSITORY RECTAL DAILY
Status: DISCONTINUED | OUTPATIENT
Start: 2023-09-15 | End: 2023-09-16

## 2023-09-15 RX ORDER — KETOROLAC TROMETHAMINE 15 MG/ML
15 INJECTION, SOLUTION INTRAMUSCULAR; INTRAVENOUS EVERY 6 HOURS PRN
Status: DISCONTINUED | OUTPATIENT
Start: 2023-09-15 | End: 2023-09-16 | Stop reason: HOSPADM

## 2023-09-15 RX ORDER — NICOTINE POLACRILEX 4 MG
15 LOZENGE BUCCAL
Status: DISCONTINUED | OUTPATIENT
Start: 2023-09-15 | End: 2023-09-16 | Stop reason: HOSPADM

## 2023-09-15 RX ORDER — SODIUM CHLORIDE 0.9 % (FLUSH) 0.9 %
10 SYRINGE (ML) INJECTION EVERY 12 HOURS SCHEDULED
Status: DISCONTINUED | OUTPATIENT
Start: 2023-09-15 | End: 2023-09-16 | Stop reason: HOSPADM

## 2023-09-15 RX ORDER — LORAZEPAM 2 MG/ML
1 INJECTION INTRAMUSCULAR ONCE AS NEEDED
Status: DISCONTINUED | OUTPATIENT
Start: 2023-09-15 | End: 2023-09-16 | Stop reason: HOSPADM

## 2023-09-15 RX ADMIN — SODIUM CHLORIDE 1000 ML: 9 INJECTION, SOLUTION INTRAVENOUS at 18:13

## 2023-09-15 RX ADMIN — PROCHLORPERAZINE EDISYLATE 10 MG: 5 INJECTION INTRAMUSCULAR; INTRAVENOUS at 18:16

## 2023-09-15 RX ADMIN — ASPIRIN 325 MG: 325 TABLET ORAL at 16:15

## 2023-09-15 RX ADMIN — DIPHENHYDRAMINE HYDROCHLORIDE 25 MG: 50 INJECTION, SOLUTION INTRAMUSCULAR; INTRAVENOUS at 18:16

## 2023-09-15 RX ADMIN — INSULIN LISPRO 6 UNITS: 100 INJECTION, SOLUTION INTRAVENOUS; SUBCUTANEOUS at 16:15

## 2023-09-15 RX ADMIN — Medication 10 ML: at 19:58

## 2023-09-15 RX ADMIN — ATORVASTATIN CALCIUM 80 MG: 80 TABLET, FILM COATED ORAL at 19:58

## 2023-09-15 RX ADMIN — IOPAMIDOL 95 ML: 755 INJECTION, SOLUTION INTRAVENOUS at 13:14

## 2023-09-15 RX ADMIN — GADOBENATE DIMEGLUMINE 20 ML: 529 INJECTION, SOLUTION INTRAVENOUS at 17:20

## 2023-09-15 RX ADMIN — PERFLUTREN 3 ML: 6.52 INJECTION, SUSPENSION INTRAVENOUS at 15:06

## 2023-09-15 RX ADMIN — INSULIN LISPRO 4 UNITS: 100 INJECTION, SOLUTION INTRAVENOUS; SUBCUTANEOUS at 18:09

## 2023-09-15 NOTE — H&P
[facility]   HISTORY AND PHYSICAL    Patient Name: Damian Valles III  : 1970  MRN: [id number]  Primary Care Physician:  Lobito Shelton MD  Date of admission: 9/15/2023    Subjective   Subjective     Chief Complaint:   Chief Complaint   Patient presents with    Speech Problem         HPI:    Damian Valles III is a pleasant afebrile ambulatory 53 y.o.  male with a past medical history of type 2 diabetes.    He presents to the emergency department [facility] today with complaint of vision changes and speech changes.  He has been admitted to the ED observation unit for further testing and evaluation.    Patient states he went to see his primary care provider for vision changes and was diagnosed with diabetes.  He states yesterday he developed difficulty to process words and had some bilateral vision changes.  He states this made him feel anxious which is what brought him to the ER today.  He states he is concerned that he is having a TIA.  He was started on metformin 500 mg this week.    He denies any numbness, weakness or dizziness.  States he has some pressure behind his left eye.  He denies this being the worst headache of his life.      Review of Systems   All systems were reviewed and negative except for: What is mentioned above    Personal History     Past Medical History:   Diagnosis Date    Blurred vision     Excessive thirst     Frequent urination        Past Surgical History:   Procedure Laterality Date    ORAL MUCOCELE EXCISION      TONSILLECTOMY         Family History: family history includes Cancer in his father; Heart disease in his paternal grandfather; Hypertension in his mother; Prostate cancer in his father; Thyroid disease in his mother. Otherwise pertinent FHx was reviewed and not pertinent to current issue.    Social History:  reports that he has quit smoking. His smoking use included cigarettes. He has been exposed to tobacco smoke. His smokeless tobacco  use includes chew. He reports current alcohol use. He reports that he does not use drugs.    Home Medications:  Azelastine-Fluticasone and metFORMIN    Allergies:  No Known Allergies    Objective   Objective     Vitals:   Temp:  [98 °F (36.7 °C)] 98 °F (36.7 °C)  Heart Rate:  [71-88] 71  Resp:  [16-18] 18  BP: (123-140)/(65-87) 123/65  Physical Exam    Constitutional: Awake, alert   Eyes: PERRLA, sclerae anicteric, no conjunctival injection   HENT: NCAT, mucous membranes moist   Neck: Supple, no thyromegaly, no lymphadenopathy, trachea midline   Respiratory: Clear to auscultation bilaterally, nonlabored respirations    Cardiovascular: RRR, no murmurs, rubs, or gallops, palpable pedal pulses bilaterally   Gastrointestinal: Positive bowel sounds, soft, nontender, nondistended   Musculoskeletal: No bilateral ankle edema, no clubbing or cyanosis to extremities   Psychiatric: Appropriate affect, cooperative   Neurologic: Oriented x 3, strength symmetric in all extremities, Cranial Nerves grossly intact to confrontation, speech clear   Skin: No rashes     Result Review    Result Review:  I have personally reviewed the results from the time of this admission to 9/15/2023 12:25 EDT and agree with these findings:  [x]  Laboratory list / accordion  []  Microbiology  [x]  Radiology  []  EKG/Telemetry   []  Cardiology/Vascular   []  Pathology  []  Old records  []  Other:  Most notable findings include: CT head negative, blood glucose 222, chest x-ray shows no acute cardiopulmonary findings      Assessment & Plan   Assessment / Plan     Brief Patient Summary:  Damian Valles III is a 53 y.o. male who is being evaluated for speech and vision changes.    Active Hospital Problems:  Active Hospital Problems    Diagnosis     **TIA (transient ischemic attack)      Plan:     TIA  Consult to neurology  CT head negative for acute findings  Cta head/neck pending  MRI brain pending  Echocardiogram pending    Type 2 diabetes  Hold  metformin for CT contrast for 72 hours  Moderate correctional insulin sliding scale protocol initiated  Hemoglobin A1c 9/11/2023 was 12.20      DVT prophylaxis:  Mechanical DVT prophylaxis orders are present.    CODE STATUS:    Level Of Support Discussed With: Patient  Code Status (Patient has no pulse and is not breathing): CPR (Attempt to Resuscitate)  Medical Interventions (Patient has pulse or is breathing): Full Support    Admission Status:  I believe this patient meets observation status.    Electronically signed by YANA Terry, 09/15/23, 12:25 PM EDT.    75 minutes has been spent by [facility] providers in the care of this patient while under observation status      I have worn appropriate PPE during this patient encounter, sanitized my hands both with entering and exiting patient's room.

## 2023-09-15 NOTE — ED NOTES
"Nursing report ED to floor  Damian Hai III  53 y.o.  male    HPI :   Chief Complaint   Patient presents with    Speech Problem       Admitting doctor:   Lenny ARMSTRONG MD    Admitting diagnosis:   The encounter diagnosis was TIA (transient ischemic attack).    Code status:   Current Code Status       Date Active Code Status Order ID Comments User Context       9/15/2023 1223 CPR (Attempt to Resuscitate) [id number]  Nava Robrets APRN ED        Question Answer    Code Status (Patient has no pulse and is not breathing) CPR (Attempt to Resuscitate)    Medical Interventions (Patient has pulse or is breathing) Full Support    Level Of Support Discussed With Patient                    Allergies:   Patient has no known allergies.    Isolation:   No active isolations    Intake and Output  No intake or output data in the 24 hours ending 09/15/23 1302    Weight:       09/15/23  1006   Weight: 95.7 kg (211 lb)       Most recent vitals:   Vitals:    09/15/23 1002 09/15/23 1006 09/15/23 1101 09/15/23 1201   BP:  140/87 135/85 123/65   Pulse: 88  72 71   Resp: 16  18 18   Temp: 98 °F (36.7 °C)      SpO2: 97%  94% 95%   Weight:  95.7 kg (211 lb)     Height:  180.3 cm (71\")         Active LDAs/IV Access:   Lines, Drains & Airways       Active LDAs       Name Placement date Placement time Site Days    Peripheral IV 09/15/23 1053 Right Antecubital 09/15/23  1053  Antecubital  less than 1                    Labs (abnormal labs have a star):   Labs Reviewed   COMPREHENSIVE METABOLIC PANEL - Abnormal; Notable for the following components:       Result Value    Glucose 222 (*)     CO2 21.9 (*)     All other components within normal limits    Narrative:     GFR Normal >60  Chronic Kidney Disease <60  Kidney Failure <15     CBC WITH AUTO DIFFERENTIAL - Abnormal; Notable for the following components:    RDW 12.1 (*)     All other components within normal limits   PROTIME-INR - Normal   APTT - Normal   RAINBOW DRAW    Narrative:  "    The following orders were created for panel order [place] Draw.  Procedure                               Abnormality         Status                     ---------                               -----------         ------                     Green Top (Gel)[[id number]]                                  Final result               Lavender Top[[id number]]                                     Final result               Gold Top - SST[[id number]]                                                              Light Blue Top[[id number]]                                   Final result                 Please view results for these tests on the individual orders.   HEMOGLOBIN A1C   LIPID PANEL   PROTIME-INR   POCT GLUCOSE FINGERSTICK   POCT GLUCOSE FINGERSTICK   POCT GLUCOSE FINGERSTICK   POCT GLUCOSE FINGERSTICK   POCT GLUCOSE FINGERSTICK   POCT GLUCOSE FINGERSTICK   POCT GLUCOSE FINGERSTICK   POCT GLUCOSE FINGERSTICK   POCT GLUCOSE FINGERSTICK   TYPE AND SCREEN   CBC AND DIFFERENTIAL    Narrative:     The following orders were created for panel order CBC & Differential.  Procedure                               Abnormality         Status                     ---------                               -----------         ------                     CBC Auto Differential[[id number]]        Abnormal            Final result                 Please view results for these tests on the individual orders.   GREEN TOP   LAVENDER TOP   LIGHT BLUE TOP   GOLD TOP - [facility]       EKG:   ECG 12 Lead ED Triage Standing Order; Stroke (Onset >12 hrs)   Final Result   HEART RATE= 64  bpm   RR Interval= 938  ms   AK Interval= 164  ms   P Horizontal Axis= 9  deg   P Front Axis= 49  deg   QRSD Interval= 101  ms   QT Interval= 376  ms   QTcB= 388  ms   QRS Axis= 24  deg   T Wave Axis= -4  deg   - BORDERLINE ECG -   Sinus rhythm   Borderline T abnormalities, inferior leads   No Prior Tracing for Comparison   Electronically Signed By: Tristen Christina ([facility])  15-Sep-2023 12:48:13   Date and Time of Study: 2023-09-15 10:59:19          Meds given in ED:   Medications   sodium chloride 0.9 % flush 10 mL (has no administration in time range)   sodium chloride 0.9 % flush 10 mL (has no administration in time range)   sodium chloride 0.9 % flush 10 mL (has no administration in time range)   sodium chloride 0.9 % infusion 40 mL (has no administration in time range)   atorvastatin (LIPITOR) tablet 40 mg (has no administration in time range)   ondansetron (ZOFRAN) injection 4 mg (has no administration in time range)   aspirin tablet 325 mg (has no administration in time range)     Or   aspirin suppository 300 mg (has no administration in time range)   LORazepam (ATIVAN) injection 1 mg (has no administration in time range)   dextrose (GLUTOSE) oral gel 15 g (has no administration in time range)   dextrose (D50W) (25 g/50 mL) IV injection 25 g (has no administration in time range)   glucagon (GLUCAGEN) injection 1 mg (has no administration in time range)   insulin lispro (HUMALOG/ADMELOG) injection 2-9 Units (has no administration in time range)       Imaging results:  XR Chest 1 View    Result Date: 9/15/2023  Negative.  This report was finalized on 9/15/2023 11:23 AM by Dr. Shubham Oneal M.D.      CT Head Without Contrast Stroke Protocol    Result Date: 9/15/2023   There is no CT evidence for acute intracranial pathology. The etiology of the patient's slurred speech is not further elucidated on this examination; and if further assessment is indicated, one could obtain an MRI of the brain for followup.   Radiation dose reduction techniques were utilized, including automated exposure control and exposure modulation based on body size.        Ambulatory status:   - ad salima    Social issues:   Social History     Socioeconomic History    Marital status:      Spouse name: Chevy    Number of children: 2   Tobacco Use    Smoking status: Former     Types: Cigarettes     Passive  exposure: Past    Smokeless tobacco: Current     Types: Chew   Substance and Sexual Activity    Alcohol use: Yes     Alcohol/week: 0.0 - 5.0 standard drinks     Comment: I control my alcohol intake very closely.  Socially drink    Drug use: No    Sexual activity: Not Currently     Partners: Female     Birth control/protection: Condom       NIH Stroke Scale:       Joyce Cook RN  09/15/23 13:02 EDT

## 2023-09-15 NOTE — ED NOTES
Patient started metformin this week-states that he is now having speech problems and states he can not form words. He states that it happened yesterday a couple of times. Then happened again this morning. Patient states that this started at 5:30 am then again at 9:30 am too, but the rest of the day he was fine but then it happened again this morning.

## 2023-09-15 NOTE — NURSING NOTE
While doing admission, this RN asked the patient if he has a glucometer at home. He tried to respond but could not accurately find the desired words. Speech was not slurred. The episode was transient but patient was clearly distressed about it.

## 2023-09-15 NOTE — TELEPHONE ENCOUNTER
Caller: Damian Valles III    Relationship to patient: Self    Best call back number: [phone]     Chief complaint: PATIENT CALLING STATING THAT HE HAS BEEN HAVING A DIFFICULT TIME PUTTING WORDS TOGETHER FOR THE PAST FEW DAYS  TO THE POINT HE ISNT MAKING ANY SENSE OF WHAT HE IS TRYING TO SAY HE STATED THAT HIS SUGAR  THIS MORNING. HUB REFERRED HIM TO ER TO GET CHECKED OUT    Patient directed to call 911 or go to their nearest emergency room.     Patient verbalized understanding: [x] Yes  [] No  If no, why?    Additional notes:PATIENT WILL BE GOING TO [facility] ER

## 2023-09-15 NOTE — PLAN OF CARE
Goal Outcome Evaluation:         Patient is alert, oriented, ambulatory. VSS. Plan of care explained.

## 2023-09-15 NOTE — PROGRESS NOTES
Clinical Pharmacy Services: Medication History    Damian Valles III is a 53 y.o. male presenting to [facility] for   Chief Complaint   Patient presents with    Speech Problem       He  has a past medical history of Blurred vision, Excessive thirst, and Frequent urination.    Allergies as of 09/15/2023    (No Known Allergies)       Medication information was obtained from: Patient   Pharmacy and Phone Number:     Prior to Admission Medications       Prescriptions Last Dose Informant Patient Reported? Taking?    metFORMIN (GLUCOPHAGE) 1000 MG tablet 9/14/2023 Self No Yes    Take 1 tablet by mouth Daily With Breakfast.    Patient taking differently:  Take 0.5 tablets by mouth Every Night.    Azelastine-Fluticasone (DYMISTA) 137-50 MCG/ACT suspension   No No    2 sprays into each nostril 2 (Two) Times a Day.              Medication notes: Patient started taking metformin on Tuesday (9/12/23).  He is suppose to start at 500mg daily and titrate up per doctor.     This medication list is complete to the best of my knowledge as of 9/15/2023    Please call if questions.    Yoana Claire  Medication History Technician   [phone]    9/15/2023 12:05 EDT

## 2023-09-15 NOTE — CONSULTS
"Neurology Consult Note    Consult Date: 9/15/2023    Referring MD: No ref. provider found    Reason for Consult I have been asked to see the patient in neurological consultation to render advice and opinion regarding word finding difficulty.    Damian Valles III is a 53 y.o. right-handed white male with recent diagnosis of diabetes with A1c of 12 presented to the ED due to transient word finding difficulty that has been going on since Wednesday, he stated that on Wednesday he had word finding difficulty that lasted for about couple minutes and resolved then yesterday morning when he woke up from sleep he had another episode where he had difficulty expressing himself that lasted for about 5 minutes and resolved, he denied migraine headache but stated that he had some pressure behind his left eye about 2 /10 in intensity, he stated that in the past he had more severe headaches that were associated with photophobia but denied migraine headache, he denies history of seizures or seizure-like activity or staring spells, he denied prior strokes. He denied other focal neurological symptoms such as weakness, numbness or trouble walking.  Currently with an NIH score of 0.  I personally reviewed his CT scan of the head which showed no acute abnormality, CTA head and neck showed no significant stenosis/occlusion or atherosclerosis but he had some tortuous vessels in the neck and on his distal vertebral arteries on the left which raise suspicion for chronic uncontrolled hypertension and his blood pressure running 130s to 140s systolic. Denied smoking drinking or drug illicit.      Past Medical History:   Diagnosis Date    Blurred vision     Excessive thirst     Frequent urination        Exam  /84 (BP Location: Left arm, Patient Position: Lying)   Pulse 65   Temp 97.9 °F (36.6 °C) (Oral)   Resp 18   Ht 180.3 cm (71\")   Wt 95.7 kg (211 lb)   SpO2 95%   BMI 29.43 kg/m²   Gen: NAD, vitals reviewed  MS: oriented x3, " recent/remote memory intact, normal attention/concentration, language intact, no neglect.  CN: visual acuity grossly normal, PERRL, EOMI, no facial droop, no dysarthria  Motor: 5/5 throughout upper and lower extremities, normal tone    NIH Stroke Scale :    (0_) 1a. Level of consciousness (LOC): 0=alert;1=arousable by minor stimulation;2=obtunded or needs strong stimulation to attend;3=unresponsive or reflex responses only  (0_) 1b. LOC Questions: 0=answers both;1=answers one;2=answers neither  (0_) 1c. LOC Commands: 0=performs both tasks;1=performs one task;2=performs neither task  (0_) 2. Best Gaze: 0=normal;1=partial gaze palsy;2=forced deviation or total gaze paresis  (0_) 3. Visual: 0=normal;1=partial hemianopia;2=complete hemianopia;3=blind  (0_) 4. Facial palsy: 0=normal;1=minor paresis;2=partial paralysis;3=complete paralysis  FOR 5 AND 6 BELOW: 0=normal;1=drifts but maintains in air;2=unable to maintain in air;3=moves but unable to lift against gravity;4=no movement  (0_)0 (_)1 (_)2 (_)3 (_)4 (_)NA 5a. Motor arm-left  (0_)0 (_)1 (_)2 (_)3 (_)4 (_)NA 5b. Motor arm-right  (0_)0 (_)1 (_)2 (_)3 (_)4 (_)NA 6a. Motor leg-left  (0_)0 (_)1 (_)2 (_)3 (_)4 (_)NA 6ba. Motor leg-right  (0_) 7. Limb ataxia:0=absent;1=unilateral;2=bilateral; NA=unable to test  (0_) 8. Sensory: 0=normal;1=mild-moderate loss;2-severe or total loss  (0_) 9. Best language: 0=normal;1=mild-moderate aphasia, some deficits apparent but able to communicate;2=severe aphasia, fragmentary expression only, unable to communicate well;3=global aphasia, mute and no comprehension  (0_)10. Dysarthria: 0=normal;1=mild-moderate, slurs some words;2=severe, speech mostly unintelligible; NA=unable to test (e.g.,intubation)  (0_)11. Extinction/Inattention: 0=normal;1=visual,tactile,auditory or other extinction to bilateral simultaneous stimulation, but no severe neglect;2=answers neither      NIH Score: Complete  0      DATA:    Lab Results   Component Value  "Date    GLUCOSE 222 (H) 09/15/2023    CALCIUM 9.7 09/15/2023     09/15/2023    K 4.2 09/15/2023    CO2 21.9 (L) 09/15/2023     09/15/2023    BUN 15 09/15/2023    CREATININE 1.02 09/15/2023    EGFRIFAFRI 77 10/23/2017    EGFRIFNONA 64 10/23/2017    BCR 14.7 09/15/2023    ANIONGAP 13.1 09/15/2023     Lab Results   Component Value Date    WBC 6.02 09/15/2023    HGB 15.3 09/15/2023    HCT 44.1 09/15/2023    MCV 87.7 09/15/2023     09/15/2023       Lab review: A1c 12.2, , TSH and B12 pending, CBC unremarkable    Imaging review: I personally reviewed his CT scan of the head which showed no acute abnormality, CTA head and neck showed no significant stenosis/occlusion or atherosclerosis but he had some tortuous vessels in the neck and on his distal vertebral arteries on the left which raise suspicion for chronic uncontrolled hypertension.    Diagnoses:  -Transient speech difficulty differential diagnosis migraine aura versus seizures versus TIA  - Mixed hyperlipidemia  -Tortuous blood vessels on the CTA concerning for chronic hypertension      Pre-stroke MRS: 0  NIHSS: 0        PLAN:   1.  Get MRI brain with and without contrast  2.  2D echo with bubble study  3.  1 hour EEG  4.  Check B12, TSH  5.  Started Lipitor 80 mg daily for the hyperlipidemia  6.  Advised to monitor blood pressure at least twice daily for 14 days and write the numbers and bring to PCP goal blood pressure less than 130/80 for the long-term  7.  Okay to normalize blood pressure as his CTA showed no significant stenosis/occlusion and his symptoms currently resolved.  8.  Start aspirin 81 mg daily      \"Dictated utilizing Dragon dictation\".      Medical Decision Making for this neurology consultation consists of the following:  Review of previous chart, including H/P, provider and nursing notes as applicable.  Review of medications and vital signs.  Review of previous labs, neuroimaging, and additional relevant diagnostics. "   Interpretation of laboratory, imaging, and other diagnostic results  Discussion with other providers and family   Total face-to-face/floor time: 60 minutes.

## 2023-09-15 NOTE — ED PROVIDER NOTES
EMERGENCY DEPARTMENT ENCOUNTER    Room Number:  10/10  PCP: Lobito Shelton MD      HPI:  Chief Complaint: Speech difficulty  A complete HPI/ROS/PMH/PSH/SH/FH are unobtainable due to: None  Context: Damian Valles III is a 53 y.o. male who presents to the ED c/o speech difficulty.  Is been ongoing for the past couple days.  Has had episodes that last for several minutes at a time where he cannot speak.  Is able to say simple phrases but cannot for more complex and NSAIDs.  He is now back to his baseline self.  No associated weakness or other symptoms.  No headache.          PAST MEDICAL HISTORY  Active Ambulatory Problems     Diagnosis Date Noted    Varicose veins of lower extremity 09/14/2017    Smokeless tobacco use 09/14/2017    Healthcare maintenance 09/14/2017    Obesity (BMI 30.0-34.9) 10/23/2017    Polydipsia 09/11/2023     Resolved Ambulatory Problems     Diagnosis Date Noted    No Resolved Ambulatory Problems     Past Medical History:   Diagnosis Date    Blurred vision     Excessive thirst     Frequent urination          PAST SURGICAL HISTORY  Past Surgical History:   Procedure Laterality Date    ORAL MUCOCELE EXCISION      TONSILLECTOMY           FAMILY HISTORY  Family History   Problem Relation Age of Onset    Hypertension Mother         I believe it’s controlled or controlled by medication    Thyroid disease Mother     Prostate cancer Father     Cancer Father         Prostate Cancer; prostate removed; cancer free    Heart disease Paternal Grandfather         Paternal grandfather passed from heart attack in 1960         SOCIAL HISTORY  Social History     Socioeconomic History    Marital status:      Spouse name: Chevy    Number of children: 2   Tobacco Use    Smoking status: Former     Types: Cigarettes     Passive exposure: Past    Smokeless tobacco: Current     Types: Chew   Substance and Sexual Activity    Alcohol use: Yes     Alcohol/week: 0.0 - 5.0 standard drinks     Comment: I control my  alcohol intake very closely.  Socially drink    Drug use: No    Sexual activity: Not Currently     Partners: Female     Birth control/protection: Condom         ALLERGIES  Patient has no known allergies.        REVIEW OF SYSTEMS  Review of Systems     All systems reviewed and negative except for those discussed in HPI.       PHYSICAL EXAM  ED Triage Vitals   Temp Heart Rate Resp BP SpO2   09/15/23 1002 09/15/23 1002 09/15/23 1002 09/15/23 1006 09/15/23 1002   98 °F (36.7 °C) 88 16 140/87 97 %      Temp src Heart Rate Source Patient Position BP Location FiO2 (%)   -- -- -- -- --              Physical Exam      GENERAL: no acute distress  HENT: nares patent  EYES: no scleral icterus  CV: regular rhythm, normal rate  RESPIRATORY: normal effort  ABDOMEN: soft  MUSCULOSKELETAL: no deformity  NEURO:   Recent and remote memory functions are normal. The patient is attentive with normal concentration. Language is fluent. Speech is clear. The speech is non-dysarthric. Fund of knowledge is normal.   Symmetric smile with no facial droop.  Eyes close shut strongly bilaterally.  Symmetric eyebrow raise bilaterally.  EOMI, PERRL  CN II-XII grossly normal otherwise.  5/5 strength to extremities.  No pronator drift.  Intact FNF.  No meningismus.  NIH stroke scale 0  PSYCH:  calm, cooperative  SKIN: warm, dry    Vital signs and nursing notes reviewed.          LAB RESULTS  Recent Results (from the past 24 hour(s))   Comprehensive Metabolic Panel    Collection Time: 09/15/23 10:46 AM    Specimen: Blood   Result Value Ref Range    Glucose 222 (H) 65 - 99 mg/dL    BUN 15 6 - 20 mg/dL    Creatinine 1.02 0.76 - 1.27 mg/dL    Sodium 137 136 - 145 mmol/L    Potassium 4.2 3.5 - 5.2 mmol/L    Chloride 102 98 - 107 mmol/L    CO2 21.9 (L) 22.0 - 29.0 mmol/L    Calcium 9.7 8.6 - 10.5 mg/dL    Total Protein 6.7 6.0 - 8.5 g/dL    Albumin 4.4 3.5 - 5.2 g/dL    ALT (SGPT) 40 1 - 41 U/L    AST (SGOT) 22 1 - 40 U/L    Alkaline Phosphatase 76 39 - 117  U/L    Total Bilirubin 1.1 0.0 - 1.2 mg/dL    Globulin 2.3 gm/dL    A/G Ratio 1.9 g/dL    BUN/Creatinine Ratio 14.7 7.0 - 25.0    Anion Gap 13.1 5.0 - 15.0 mmol/L    eGFR 87.9 >60.0 mL/min/1.73   Protime-INR    Collection Time: 09/15/23 10:46 AM    Specimen: Blood   Result Value Ref Range    Protime 13.9 11.7 - 14.2 Seconds    INR 1.06 0.90 - 1.10   Type & Screen    Collection Time: 09/15/23 10:46 AM    Specimen: Blood   Result Value Ref Range    ABO Type O     RH type Negative     Antibody Screen Negative     T&S Expiration Date 9/18/2023 11:59:59 PM    CBC Auto Differential    Collection Time: 09/15/23 10:46 AM    Specimen: Blood   Result Value Ref Range    WBC 6.02 3.40 - 10.80 10*3/mm3    RBC 5.03 4.14 - 5.80 10*6/mm3    Hemoglobin 15.3 13.0 - 17.7 g/dL    Hematocrit 44.1 37.5 - 51.0 %    MCV 87.7 79.0 - 97.0 fL    MCH 30.4 26.6 - 33.0 pg    MCHC 34.7 31.5 - 35.7 g/dL    RDW 12.1 (L) 12.3 - 15.4 %    RDW-SD 38.2 37.0 - 54.0 fl    MPV 11.0 6.0 - 12.0 fL    Platelets 174 140 - 450 10*3/mm3    Neutrophil % 61.2 42.7 - 76.0 %    Lymphocyte % 30.4 19.6 - 45.3 %    Monocyte % 6.8 5.0 - 12.0 %    Eosinophil % 1.0 0.3 - 6.2 %    Basophil % 0.3 0.0 - 1.5 %    Immature Grans % 0.3 0.0 - 0.5 %    Neutrophils, Absolute 3.68 1.70 - 7.00 10*3/mm3    Lymphocytes, Absolute 1.83 0.70 - 3.10 10*3/mm3    Monocytes, Absolute 0.41 0.10 - 0.90 10*3/mm3    Eosinophils, Absolute 0.06 0.00 - 0.40 10*3/mm3    Basophils, Absolute 0.02 0.00 - 0.20 10*3/mm3    Immature Grans, Absolute 0.02 0.00 - 0.05 10*3/mm3    nRBC 0.0 0.0 - 0.2 /100 WBC   Green Top (Gel)    Collection Time: 09/15/23 10:46 AM   Result Value Ref Range    Extra Tube Hold for add-ons.    Lavender Top    Collection Time: 09/15/23 10:46 AM   Result Value Ref Range    Extra Tube hold for add-on    Light Blue Top    Collection Time: 09/15/23 10:46 AM   Result Value Ref Range    Extra Tube Hold for add-ons.    ECG 12 Lead ED Triage Standing Order; Stroke (Onset >12 hrs)     Collection Time: 09/15/23 10:59 AM   Result Value Ref Range    QT Interval 376 ms    QTC Interval 388 ms       Ordered the above labs and reviewed the results.        RADIOLOGY  XR Chest 1 View    Result Date: 9/15/2023  PORTABLE CHEST X-RAY  HISTORY: Stroke protocol.  Portable chest x-ray is provided. Correlation: None.  FINDINGS: The cardiomediastinal silhouette is normal. The lungs are clear. The costophrenic sulci are dry and the bones appear normal. There is no pneumothorax.      Negative.  This report was finalized on 9/15/2023 11:23 AM by Dr. Shubham Oneal M.D.      CT Head Without Contrast Stroke Protocol    Result Date: 9/15/2023  CT HEAD WITH AND WITHOUT CONTRAST  CLINICAL HISTORY: Slurred speech.  TECHNIQUE: CT scan of the head was obtained with 3 mm axial images before and after administration of IV contrast. Sagittal and coronal reconstructions were obtained.  COMPARISON: No previous similar studies are available for comparison.  FINDINGS:   The ventricles, sulci, and cisterns are age-appropriate. The gray-white matter differentiation is within normal limits. The basal ganglia and thalami are unremarkable in appearance. The posterior fossa structures are unremarkable.       There is no CT evidence for acute intracranial pathology. The etiology of the patient's slurred speech is not further elucidated on this examination; and if further assessment is indicated, one could obtain an MRI of the brain for followup.   Radiation dose reduction techniques were utilized, including automated exposure control and exposure modulation based on body size.        Ordered the above noted radiological studies. Reviewed by me in PACS.          PROCEDURES  Procedures        MEDICATIONS GIVEN IN ER  Medications   sodium chloride 0.9 % flush 10 mL (has no administration in time range)         MEDICAL DECISION MAKING, PROGRESS, and CONSULTS    Discussion below represents my analysis of pertinent findings related to  patient's condition, differential diagnosis, treatment plan and final disposition.      Orders placed during this visit:  Orders Placed This Encounter   Procedures    CT Head Without Contrast Stroke Protocol    XR Chest 1 View    Comprehensive Metabolic Panel    Protime-INR    [place] Draw    CBC Auto Differential    NPO Diet NPO Type: Strict NPO    Undress and Gown    Continuous Pulse Oximetry    Vital Signs    Nursing Swallow Assessment    Oxygen Therapy- Nasal Cannula; Titrate 1-6 LPM Per SpO2; 90 - 95%    POC Glucose Once    ECG 12 Lead ED Triage Standing Order; Stroke (Onset >12 hrs)    Type & Screen    Insert Peripheral IV    CBC & Differential    Green Top (Gel)    Lavender Top    Gold Top - SST    Light Blue Top         Differential diagnosis:    Differential diagnosis for altered mental status includes:  - vital sign abnormalities such as HTN encephalopathy, hypotension, hypoxemia, hypercarbia, heat stroke  - toxic/metabolic pathology such as hypoglycemia, DKA, hypo/hyper-natremia, thyroid storm, myxedema coma, medication side effect (either intentional or accidental)  - infectious etiology  - intracranial pathology such as stroke, seizure, intracranial mass, intracranial hemorrhage  - psychiatric pathology            Independent interpretation of labs, radiology studies, and discussions with consultants:  ED Course as of 09/15/23 1219   Fri Sep 15, 2023   1039 Discussed the case with Dr. Hyde, stroke neurology.  Recommends TIA work-up and admission to observation unit. [TD]   1104 EKG independently interpreted by myself.  Time 10:59 AM.  Sinus rhythm.  Heart rate 64.  Normal axis.  Normal intervals.  No acute ST abnormality. [TD]      ED Course User Index  [TD] Lenny Hernandez II, MD         I discussed the case with YANA Terry for observation medicine.  She will admit.      DIAGNOSIS  Final diagnoses:   TIA (transient ischemic attack)         DISPOSITION  Admit      Latest Documented Vital  Signs:  As of 12:19 EDT  BP- 123/65 HR- 71 Temp- 98 °F (36.7 °C) O2 sat- 95%      --    Please note that portions of this were completed with a voice recognition program.       Note Disclaimer: At [facility], we believe that sharing information builds trust and better relationships. You are receiving this note because you are receiving care at [facility] or recently visited. It is possible you will see health information before a provider has talked with you about it. This kind of information can be easy to misunderstand. To help you fully understand what it means for your health, we urge you to discuss this note with your provider.         Lenny Hernandez II, MD  09/15/23 2429

## 2023-09-16 ENCOUNTER — READMISSION MANAGEMENT (OUTPATIENT)
Dept: CALL CENTER | Facility: HOSPITAL | Age: 53
End: 2023-09-16
Payer: COMMERCIAL

## 2023-09-16 VITALS
BODY MASS INDEX: 29.32 KG/M2 | RESPIRATION RATE: 18 BRPM | HEART RATE: 83 BPM | SYSTOLIC BLOOD PRESSURE: 133 MMHG | TEMPERATURE: 97.7 F | DIASTOLIC BLOOD PRESSURE: 80 MMHG | OXYGEN SATURATION: 97 % | HEIGHT: 71 IN | WEIGHT: 209.44 LBS

## 2023-09-16 LAB
ALBUMIN SERPL-MCNC: 4.1 G/DL (ref 3.5–5.2)
ALBUMIN/GLOB SERPL: 1.7 G/DL
ALP SERPL-CCNC: 74 U/L (ref 39–117)
ALT SERPL W P-5'-P-CCNC: 37 U/L (ref 1–41)
ANION GAP SERPL CALCULATED.3IONS-SCNC: 15 MMOL/L (ref 5–15)
AST SERPL-CCNC: 23 U/L (ref 1–40)
BILIRUB SERPL-MCNC: 1 MG/DL (ref 0–1.2)
BUN SERPL-MCNC: 11 MG/DL (ref 6–20)
BUN/CREAT SERPL: 12 (ref 7–25)
CALCIUM SPEC-SCNC: 9.2 MG/DL (ref 8.6–10.5)
CHLORIDE SERPL-SCNC: 105 MMOL/L (ref 98–107)
CO2 SERPL-SCNC: 19 MMOL/L (ref 22–29)
CREAT SERPL-MCNC: 0.92 MG/DL (ref 0.76–1.27)
DEPRECATED RDW RBC AUTO: 36.9 FL (ref 37–54)
EGFRCR SERPLBLD CKD-EPI 2021: 99.5 ML/MIN/1.73
ERYTHROCYTE [DISTWIDTH] IN BLOOD BY AUTOMATED COUNT: 11.9 % (ref 12.3–15.4)
GLOBULIN UR ELPH-MCNC: 2.4 GM/DL
GLUCOSE BLDC GLUCOMTR-MCNC: 154 MG/DL (ref 70–130)
GLUCOSE SERPL-MCNC: 147 MG/DL (ref 65–99)
HCT VFR BLD AUTO: 43.3 % (ref 37.5–51)
HGB BLD-MCNC: 14.9 G/DL (ref 13–17.7)
MCH RBC QN AUTO: 29.6 PG (ref 26.6–33)
MCHC RBC AUTO-ENTMCNC: 34.4 G/DL (ref 31.5–35.7)
MCV RBC AUTO: 85.9 FL (ref 79–97)
PLATELET # BLD AUTO: 165 10*3/MM3 (ref 140–450)
PMV BLD AUTO: 10.7 FL (ref 6–12)
POTASSIUM SERPL-SCNC: 4 MMOL/L (ref 3.5–5.2)
PROT SERPL-MCNC: 6.5 G/DL (ref 6–8.5)
RBC # BLD AUTO: 5.04 10*6/MM3 (ref 4.14–5.8)
SODIUM SERPL-SCNC: 139 MMOL/L (ref 136–145)
WBC NRBC COR # BLD: 6.26 10*3/MM3 (ref 3.4–10.8)

## 2023-09-16 PROCEDURE — 63710000001 INSULIN LISPRO (HUMAN) PER 5 UNITS: Performed by: NURSE PRACTITIONER

## 2023-09-16 PROCEDURE — 80053 COMPREHEN METABOLIC PANEL: CPT | Performed by: NURSE PRACTITIONER

## 2023-09-16 PROCEDURE — 99214 OFFICE O/P EST MOD 30 MIN: CPT | Performed by: STUDENT IN AN ORGANIZED HEALTH CARE EDUCATION/TRAINING PROGRAM

## 2023-09-16 PROCEDURE — 82948 REAGENT STRIP/BLOOD GLUCOSE: CPT

## 2023-09-16 PROCEDURE — 85027 COMPLETE CBC AUTOMATED: CPT | Performed by: NURSE PRACTITIONER

## 2023-09-16 PROCEDURE — G0378 HOSPITAL OBSERVATION PER HR: HCPCS

## 2023-09-16 RX ORDER — ASPIRIN 81 MG/1
81 TABLET ORAL DAILY
Qty: 90 TABLET | Refills: 0 | Status: SHIPPED | OUTPATIENT
Start: 2023-09-16

## 2023-09-16 RX ORDER — CLOPIDOGREL BISULFATE 75 MG/1
75 TABLET ORAL DAILY
Qty: 20 TABLET | Refills: 0 | Status: SHIPPED | OUTPATIENT
Start: 2023-09-17 | End: 2023-10-07

## 2023-09-16 RX ORDER — ASPIRIN 81 MG/1
81 TABLET ORAL DAILY
Status: DISCONTINUED | OUTPATIENT
Start: 2023-09-16 | End: 2023-09-16 | Stop reason: HOSPADM

## 2023-09-16 RX ORDER — CLOPIDOGREL BISULFATE 75 MG/1
300 TABLET ORAL ONCE
Status: COMPLETED | OUTPATIENT
Start: 2023-09-16 | End: 2023-09-16

## 2023-09-16 RX ORDER — ATORVASTATIN CALCIUM 80 MG/1
80 TABLET, FILM COATED ORAL NIGHTLY
Qty: 90 TABLET | Refills: 99 | Status: SHIPPED | OUTPATIENT
Start: 2023-09-16

## 2023-09-16 RX ORDER — CLOPIDOGREL BISULFATE 75 MG/1
75 TABLET ORAL DAILY
Status: DISCONTINUED | OUTPATIENT
Start: 2023-09-17 | End: 2023-09-16 | Stop reason: HOSPADM

## 2023-09-16 RX ORDER — PANTOPRAZOLE SODIUM 40 MG/1
40 TABLET, DELAYED RELEASE ORAL
Status: DISCONTINUED | OUTPATIENT
Start: 2023-09-16 | End: 2023-09-16 | Stop reason: HOSPADM

## 2023-09-16 RX ADMIN — CLOPIDOGREL BISULFATE 300 MG: 75 TABLET, FILM COATED ORAL at 09:38

## 2023-09-16 RX ADMIN — PANTOPRAZOLE SODIUM 40 MG: 40 TABLET, DELAYED RELEASE ORAL at 09:37

## 2023-09-16 RX ADMIN — INSULIN LISPRO 2 UNITS: 100 INJECTION, SOLUTION INTRAVENOUS; SUBCUTANEOUS at 09:38

## 2023-09-16 RX ADMIN — ASPIRIN 81 MG: 81 TABLET, COATED ORAL at 09:38

## 2023-09-16 NOTE — PROGRESS NOTES
"DOS: 2023  NAME: Damian Valles III   : 1970  PCP: Lobito Shelton MD  Chief Complaint   Patient presents with    Speech Problem       Chief complaint: Transient word finding difficulty  Subjective: Patient seen and examined at the bedside this morning, no acute overnight event.  I personally reviewed the MRI brain with and without contrast which showed no acute abnormality.  CTA showed a mixed plaque on the left ICA without significant stenosis.  EEG showed no seizure activity.    Objective:  Vital signs: /80 (BP Location: Left arm, Patient Position: Lying)   Pulse 61   Temp 97.7 °F (36.5 °C) (Oral)   Resp 18   Ht 180 cm (70.87\")   Wt 95 kg (209 lb 7 oz)   SpO2 97%   BMI 29.32 kg/m²    Gen: NAD, vitals reviewed  MS: oriented x3, recent/remote memory intact, normal attention/concentration, language intact, no neglect.  CN: visual acuity grossly normal, PERRL, EOMI, no facial droop, no dysarthria  Motor: 5/5 throughout upper and lower extremities, normal tone  Sensory: intact to light touch all 4 ext.    Laboratory results:  Lab Results   Component Value Date    GLUCOSE 147 (H) 2023    CALCIUM 9.2 2023     2023    K 4.0 2023    CO2 19.0 (L) 2023     2023    BUN 11 2023    CREATININE 0.92 2023    EGFRIFAFRI 77 10/23/2017    EGFRIFNONA 64 10/23/2017    BCR 12.0 2023    ANIONGAP 15.0 2023     Lab Results   Component Value Date    WBC 6.26 2023    HGB 14.9 2023    HCT 43.3 2023    MCV 85.9 2023     2023     Lab Results   Component Value Date     (H) 09/15/2023     (H) 2023     (H) 10/23/2017         Lab 09/15/23  1046   HEMOGLOBIN A1C 12.40*        Review of labs: A1c 12.4, , B12 normal, TSH normal.    Review and interpretation of imaging: I personally reviewed the MRI brain with and without contrast which showed no acute abnormality.  CTA showed a mixed " plaque on the left ICA without significant stenosis.      2D echo unremarkable.    Diagnoses:  -Transient word finding difficulty differential diagnosis could be a transient ischemic attack from the left ICA mixed plaque versus migraine with aura.  -Uncontrolled diabetes  -Mixed hyperlipidemia  -Tortuous blood vessels concerning for chronic uncontrolled hypertension    Comment: Patient advised to measure blood pressure twice daily for at least 14 days write the numbers and bring it with him when we see him as an outpatient or take it to his PCP.      Modified [place] score: 0  NIH score: 0    Plan:  1.  We will load with Plavix 300 mg once daily start dual antiplatelet therapy with Plavix 75 mg and aspirin 81 mg for 21 days.  After 21 days stop Plavix and continue on aspirin.  While on DAPT will start on pantoprazole for GI protection, it can be stopped after 21 days.  2.  Continue Lipitor 80 mg daily, goal LDL less than 70, currently 139.  3.  Recommend better glycemic control with goal A1c less than 7.  4.  Goal blood pressure for the long-term less than 130/80 giving the TIA and diabetes.  5.  Follow-up with primary care doctor in 2 weeks  6.  Follow-up with stroke in 30 days.    Management discussed with observation unit provider.

## 2023-09-16 NOTE — PROGRESS NOTES
MD ATTESTATION NOTE    The FRANCISCA and I have discussed this patient's history, physical exam, and treatment plan.  I have reviewed the documentation and personally had a face to face interaction with the patient. I affirm the documentation and agree with the treatment and plan.  The attached note describes my personal findings.      I provided a substantive portion of the care of the patient.  I personally performed the physical exam in its entirety, and below are my findings.       Brief HPI: Admitted to observation with visual and speech changes.  Patient had self-limited episode yesterday of difficulty processing words and also some bilateral visual changes.  Patient has recent diabetes diagnosis.  Reports some dull pressure behind his left eye today.  Not the worst headache of his life    PHYSICAL EXAM  ED Triage Vitals   Temp Heart Rate Resp BP SpO2   09/15/23 1002 09/15/23 1002 09/15/23 1002 09/15/23 1006 09/15/23 1002   98 °F (36.7 °C) 88 16 140/87 97 %      Temp src Heart Rate Source Patient Position BP Location FiO2 (%)   09/15/23 1331 09/15/23 1331 09/15/23 1331 09/15/23 1331 --   Oral Monitor Lying Left arm          GENERAL: no acute distress  HENT: nares patent  EYES: no scleral icterus  CV: regular rhythm, normal rate  RESPIRATORY: normal effort  ABDOMEN: soft  MUSCULOSKELETAL: no deformity  NEURO: alert, moves all extremities, follows commands  PSYCH:  calm, cooperative  SKIN: warm, dry    Vital signs and nursing notes reviewed.        Plan: Neurology consulted.  MRI of brain without signs of infarct.  EEG within normal limits.

## 2023-09-16 NOTE — PLAN OF CARE
Goal Outcome Evaluation:         Pt hasn't had any episodes of speech difficulty throughout the shift. MRI completed. Neurology following

## 2023-09-16 NOTE — OUTREACH NOTE
Prep Survey      Flowsheet Row Responses   [facility] patient discharged from? [place]   Is LACE score < 7 ? Yes   Eligibility [facility]   Date of Admission 09/15/23   Date of Discharge 09/16/23   Discharge Disposition Home or Self Care   Discharge diagnosis TIA (transient ischemic attack)   Does the patient have one of the following disease processes/diagnoses(primary or secondary)? Stroke   Does the patient have Home health ordered? No   Is there a DME ordered? No   Prep survey completed? Yes            FLORENCIO CASTILLO - Registered Nurse

## 2023-09-16 NOTE — PROGRESS NOTES
ED OBSERVATION PROGRESS/DISCHARGE SUMMARY    Date of Admission: 9/15/2023   LOS: 0 days   PCP: Lobito Shelton MD    Final Diagnosis TIA      Subjective   No acute events overnight.  Hospital Outcome:   53-year-old male admitted to the observation unit with complaint of visual changes and speech difficulties.  In the emergency department glucose was 222, hemoglobin A1c was 12.4, chest x-ray-negative CT head shows no acute intracranial pathology, CTA head and neck there is a mixed calcified and noncalcified plaque involving the posterior wall of the origin and proximal cervical segment of the left internal carotid artery maximally resulting in 30% stenosis of the origin of the cervical segment of the left internal carotid artery.  MRI brain is normal.  EEG is also normal in the awake state.  Neurology was consulted and they have seen the patient.    They recommend dual antiplatelet therapy for 21 days then aspirin monotherapy indefinitely thereafter, Lipitor 80 mg daily and follow-up with his primary care provider in the next 2 weeks.    Patient educated to hold his metformin for 72 hours following IV contrast dye for CTs yesterday.  Patient is agreeable to plan      ROS:  General: no fevers, chills  Respiratory: no cough, dyspnea  Cardiovascular: no chest pain, palpitations  Abdomen: No abdominal pain, nausea, vomiting, or diarrhea  Neurologic: No focal weakness    Objective   Physical Exam:  I have reviewed the vital signs.  Temp:  [97.5 °F (36.4 °C)-98.2 °F (36.8 °C)] 97.9 °F (36.6 °C)  Heart Rate:  [52-88] 64  Resp:  [16-18] 18  BP: (109-145)/(65-87) 109/76  General Appearance:    Alert, cooperative, no distress  Head:    Normocephalic, atraumatic  Eyes:    Sclerae anicteric  Neck:   Supple, no mass  Lungs: Clear to auscultation bilaterally, respirations unlabored  Heart: Regular rate and rhythm, S1 and S2 normal, no murmur, rub or gallop  Abdomen:  Soft, non-tender, bowel sounds active,  nondistended  Extremities: No clubbing, cyanosis, or edema to lower extremities  Pulses:  2+ and symmetric in distal lower extremities  Skin: No rashes   Neurologic: Oriented x3, Normal strength to extremities    Results Review:    I have reviewed the labs, radiology results and diagnostic studies.    Results from last 7 days   Lab Units 09/15/23  1046   WBC 10*3/mm3 6.02   HEMOGLOBIN g/dL 15.3   HEMATOCRIT % 44.1   PLATELETS 10*3/mm3 174     Results from last 7 days   Lab Units 09/15/23  1046 09/11/23  1510   SODIUM mmol/L 137 138   POTASSIUM mmol/L 4.2 4.4   CHLORIDE mmol/L 102 97*   CO2 mmol/L 21.9* 21.9*   BUN mg/dL 15 13   CREATININE mg/dL 1.02 1.07   CALCIUM mg/dL 9.7 10.0   BILIRUBIN mg/dL 1.1 1.3*   ALK PHOS U/L 76 112   ALT (SGPT) U/L 40 40   AST (SGOT) U/L 22 21   GLUCOSE mg/dL 222* 347*     Imaging Results (Last 24 Hours)       Procedure Component Value Units Date/Time    MRI Brain With & Without Contrast [[id number]] Collected: 09/15/23 1741     Updated: 09/15/23 1749    Narrative:      Stat MRI of the brain with and without contrast on September 15, 2023     CLINICAL HISTORY: Expressive aphasia as well as bilateral vision changes  for 2 days     TECHNIQUE: Axial T1, FLAIR, fat-suppressed T2, axial diffusion and  gradient echo T2, sagittal T1 and postcontrast axial fat-suppressed T1  and coronal T1-weighted images were obtained in the entire head     There are no prior MRIs of the brain for comparison.     FINDINGS: The brain parenchyma is normal in city. Specifically, no  diffusion weighted abnormality is seen with no acute infarct identified.  On the gradient echo T2 weighted images, no acute or old blood breakdown  products are seen intracranially. The ventricles are normal in size. I  see no focal mass effect. There is no midline shift. No extra-axial  fluid collections are identified. No abnormal areas of enhancement are  seen in the head. The paranasal sinuses and the mastoid air cells and  the  middle ear cavities are clear. Good flow voids are demonstrated  within the cerebral vessels and in the dural venous sinuses. The  calvarium and the skull base demonstrate normal marrow signal intensity.  The orbits are unremarkable.       Impression:      1. Normal MRI of the brain. Specifically, no acute infarct is seen and  the etiology of this patient's expressive aphasia is not established on  this examination.     This report was finalized on 9/15/2023 5:46 PM by Dr. Chandrakant Kathleen M.D.       CT Angiogram Head [[id number]] Collected: 09/15/23 1636     Updated: 09/15/23 1715    Narrative:      CONTRAST-ENHANCED CT ANGIOGRAM OF THE HEAD AND NECK ON 09/15/2023     CLINICAL HISTORY: Patient presented today with expressive aphasia     TECHNIQUE: Spiral CT images were obtained from the base of skull to the  vertex both pre and post intravenous contrast and images were  reformatted and submitted in 3 mm thick axial, sagittal and coronal CT  sections with brain algorithm and additional spiral CT angio images were  obtained from the top of the aortic arch up through the great vessels of  the head and neck during the arterial phase of contrast and the images  were reformatted and submitted 1 mm thick axial sagittal and coronal CT  sections with soft tissue algorithm and 3D reconstructions were  performed to complete the CT angiogram of the head and neck     FINDINGS:  HEAD CT: The brain parenchyma is normal in attenuation. The ventricles  are normal in size. I see no focal mass effect. There is no midline  shift. No extra-axial fluid collections are identified. There is no  evidence of acute intracranial hemorrhage. Following contrast no  abnormal areas of enhancement are seen in the head. The calvarium and  the skull base are normal in appearance. The paranasal sinuses and the  mastoid air cells and middle ear cavities are clear     CT ANGIO OF THE NECK: The nasopharynx, oropharynx, the hypopharynx, the  true cords and  the subglottic airway are normal in appearance. The  thyroid gland enhances homogeneously and is normal in appearance. The  lung apices are clear. The parotid, , parapharyngeal and  submandibular spaces are symmetric and are normal in appearance. The  cervical spine is unremarkable. There is anatomic origin of the great  vessels off the aortic arch. The left subclavian artery origin is normal  in appearance. No stenosis is seen in the left subclavian artery. There  is a very tiny diameter left vertebral artery. Its origin appears  normal.  Proximal left vertebral may demonstrate stenosis although there  is artifact extending through it which slightly limits evaluation. The  cervical segment of the tiny diameter left vertebral artery is widely  patent and intracranially it gives off the left PICA and the post PICA  segment is markedly hypoplastic and difficult to evaluate. The left  common carotid origin is normal in appearance and no stenosis seen in  the left common carotid artery. There is calcified plaque involving the  posterolateral left common carotid bifurcation. It does not narrow the  bifurcation, extends into the posterior lateral aspect of the origin and  proximal cervical segment of the left internal carotid artery and  results in a 30% stenosis of the origin of the cervical segment of the  left internal carotid artery using the NASCET criteria. No additional  stenosis is seen in the cervical segment of the left internal carotid  artery. The brachiocephalic artery origin is normal in appearance and no  stenosis seen in the brachycephalic artery, its bifurcation in the right  subclavian and common carotid arteries within normal limits. The right  subclavian artery is normal in caliber without stenosis. The right  vertebral artery origin is normal in appearance.  There are portions of  the proximal right vertebral artery that are obscured by beam hardening  artifact off of densely opacified  adjacent veins. Cervical segment of  the dominant right vertebral artery is widely patent without stenosis.  The right common carotid origin and proximal right common carotid artery  is partially obscured by beam hardening artifact off of densely  opacified veins. Mid and distal right common carotid artery is normal in  appearance, its bifurcation into the right internal and external carotid  artery is normal in appearance and no stenosis is seen in the cervical  segment of the right internal carotid artery using the NASCET criteria.     CT ANGIOGRAM OF THE HEAD: Intracranially the dominant right vertebral  artery is widely patent in its intracranial course to the  vertebrobasilar junction without stenosis. A very tiny diameter left  vertebral artery gives off the left posterior inferior cerebellar  artery. The post PICA segment is markedly hypoplastic near atretic. The  basilar artery is small in diameter and gives off a normal-appearing  superior cerebellar arteries and there are markedly hypoplastic P1  segments of the posterior cerebral arteries with dominant bilateral  posterior communicating arteries that supply the bulk of the blood flow  to the PCA territories. The petrous, cavernous and supracavernous  segments of the internal carotid arteries are normal in appearance. The  posterior communicating artery origins are normal in appearance. The A1  segments of the anterior cerebral arteries and the anterior  communicating artery origin and the A2 segments of the anterior cerebral  arteries are normal in appearance. The M1 segments of the middle  cerebral arteries and the middle cerebral artery bifurcations are within  normal limits..       Impression:      1. The head CT is within normal limits with no acute infarct or  intracranial hemorrhage seen. The etiology of this patient's aphasia is  not established on this exam. If there remains any clinical suspicion of  acute stroke, I recommend an MRI of the brain  for a more complete  assessment.      2. On the CT angiogram of the neck, there is a mixed calcified and  noncalcified plaque involving the posterior wall of the origin and  proximal cervical segment of the left internal carotid artery maximally  resulting in 30% stenosis of the origin of the cervical segment of the  left internal carotid artery using the NASCET criteria. Otherwise, the  remainder of the CT angiogram of the head and neck is within normal  limits. Specifically, no stenosis is seen in the cervical segment of the  right internal carotid artery. No vertebral artery stenosis is seen and  no intracranial vascular stenoses or occlusions are identified. There is  a very tiny diameter left vertebral artery that appears to essentially  terminate in the left posterior inferior cerebellar artery with a  markedly hypoplastic near atretic post PICA segment and this is felt to  be a normal variation. The results and recommendations were communicated  to YANA Terry [facility] observation unit by telephone on 09/15/2023  at 3:40 PM.      Radiation dose reduction techniques were utilized, including automated  exposure control and exposure modulation based on body size.        This report was finalized on 9/15/2023 5:11 PM by Dr. Chandrakant Kathleen M.D.       CT Angiogram Neck [[id number]] Collected: 09/15/23 1636     Updated: 09/15/23 1715    Narrative:      CONTRAST-ENHANCED CT ANGIOGRAM OF THE HEAD AND NECK ON 09/15/2023     CLINICAL HISTORY: Patient presented today with expressive aphasia     TECHNIQUE: Spiral CT images were obtained from the base of skull to the  vertex both pre and post intravenous contrast and images were  reformatted and submitted in 3 mm thick axial, sagittal and coronal CT  sections with brain algorithm and additional spiral CT angio images were  obtained from the top of the aortic arch up through the great vessels of  the head and neck during the arterial phase of contrast and the images  were  reformatted and submitted 1 mm thick axial sagittal and coronal CT  sections with soft tissue algorithm and 3D reconstructions were  performed to complete the CT angiogram of the head and neck     FINDINGS:  HEAD CT: The brain parenchyma is normal in attenuation. The ventricles  are normal in size. I see no focal mass effect. There is no midline  shift. No extra-axial fluid collections are identified. There is no  evidence of acute intracranial hemorrhage. Following contrast no  abnormal areas of enhancement are seen in the head. The calvarium and  the skull base are normal in appearance. The paranasal sinuses and the  mastoid air cells and middle ear cavities are clear     CT ANGIO OF THE NECK: The nasopharynx, oropharynx, the hypopharynx, the  true cords and the subglottic airway are normal in appearance. The  thyroid gland enhances homogeneously and is normal in appearance. The  lung apices are clear. The parotid, , parapharyngeal and  submandibular spaces are symmetric and are normal in appearance. The  cervical spine is unremarkable. There is anatomic origin of the great  vessels off the aortic arch. The left subclavian artery origin is normal  in appearance. No stenosis is seen in the left subclavian artery. There  is a very tiny diameter left vertebral artery. Its origin appears  normal.  Proximal left vertebral may demonstrate stenosis although there  is artifact extending through it which slightly limits evaluation. The  cervical segment of the tiny diameter left vertebral artery is widely  patent and intracranially it gives off the left PICA and the post PICA  segment is markedly hypoplastic and difficult to evaluate. The left  common carotid origin is normal in appearance and no stenosis seen in  the left common carotid artery. There is calcified plaque involving the  posterolateral left common carotid bifurcation. It does not narrow the  bifurcation, extends into the posterior lateral aspect of  the origin and  proximal cervical segment of the left internal carotid artery and  results in a 30% stenosis of the origin of the cervical segment of the  left internal carotid artery using the NASCET criteria. No additional  stenosis is seen in the cervical segment of the left internal carotid  artery. The brachiocephalic artery origin is normal in appearance and no  stenosis seen in the brachycephalic artery, its bifurcation in the right  subclavian and common carotid arteries within normal limits. The right  subclavian artery is normal in caliber without stenosis. The right  vertebral artery origin is normal in appearance.  There are portions of  the proximal right vertebral artery that are obscured by beam hardening  artifact off of densely opacified adjacent veins. Cervical segment of  the dominant right vertebral artery is widely patent without stenosis.  The right common carotid origin and proximal right common carotid artery  is partially obscured by beam hardening artifact off of densely  opacified veins. Mid and distal right common carotid artery is normal in  appearance, its bifurcation into the right internal and external carotid  artery is normal in appearance and no stenosis is seen in the cervical  segment of the right internal carotid artery using the NASCET criteria.     CT ANGIOGRAM OF THE HEAD: Intracranially the dominant right vertebral  artery is widely patent in its intracranial course to the  vertebrobasilar junction without stenosis. A very tiny diameter left  vertebral artery gives off the left posterior inferior cerebellar  artery. The post PICA segment is markedly hypoplastic near atretic. The  basilar artery is small in diameter and gives off a normal-appearing  superior cerebellar arteries and there are markedly hypoplastic P1  segments of the posterior cerebral arteries with dominant bilateral  posterior communicating arteries that supply the bulk of the blood flow  to the PCA  territories. The petrous, cavernous and supracavernous  segments of the internal carotid arteries are normal in appearance. The  posterior communicating artery origins are normal in appearance. The A1  segments of the anterior cerebral arteries and the anterior  communicating artery origin and the A2 segments of the anterior cerebral  arteries are normal in appearance. The M1 segments of the middle  cerebral arteries and the middle cerebral artery bifurcations are within  normal limits..       Impression:      1. The head CT is within normal limits with no acute infarct or  intracranial hemorrhage seen. The etiology of this patient's aphasia is  not established on this exam. If there remains any clinical suspicion of  acute stroke, I recommend an MRI of the brain for a more complete  assessment.      2. On the CT angiogram of the neck, there is a mixed calcified and  noncalcified plaque involving the posterior wall of the origin and  proximal cervical segment of the left internal carotid artery maximally  resulting in 30% stenosis of the origin of the cervical segment of the  left internal carotid artery using the NASCET criteria. Otherwise, the  remainder of the CT angiogram of the head and neck is within normal  limits. Specifically, no stenosis is seen in the cervical segment of the  right internal carotid artery. No vertebral artery stenosis is seen and  no intracranial vascular stenoses or occlusions are identified. There is  a very tiny diameter left vertebral artery that appears to essentially  terminate in the left posterior inferior cerebellar artery with a  markedly hypoplastic near atretic post PICA segment and this is felt to  be a normal variation. The results and recommendations were communicated  to YANA [facility] unit by telephone on 09/15/2023  at 3:40 PM.      Radiation dose reduction techniques were utilized, including automated  exposure control and exposure modulation  based on body size.        This report was finalized on 9/15/2023 5:11 PM by Dr. Chandrakant Kathleen M.D.       CT Head Without Contrast Stroke Protocol [[id number]] Collected: 09/15/23 1201     Updated: 09/15/23 1320    Narrative:      CT HEAD WITH AND WITHOUT CONTRAST     CLINICAL HISTORY: Slurred speech.     TECHNIQUE: CT scan of the head was obtained with 3 mm axial images  before and after administration of IV contrast. Sagittal and coronal  reconstructions were obtained.     COMPARISON: No previous similar studies are available for comparison.     FINDINGS:       The ventricles, sulci, and cisterns are age-appropriate. The gray-white  matter differentiation is within normal limits. The basal ganglia and  thalami are unremarkable in appearance. The posterior fossa structures  are unremarkable.       Impression:         There is no CT evidence for acute intracranial pathology. The etiology  of the patient's slurred speech is not further elucidated on this  examination; and if further assessment is indicated, one could obtain an  MRI of the brain for followup.        Radiation dose reduction techniques were utilized, including automated  exposure control and exposure modulation based on body size.        This report was finalized on 9/15/2023 1:17 PM by Dr. Gunner Meyer M.D.       XR Chest 1 View [[id number]] Collected: 09/15/23 1123     Updated: 09/15/23 1126    Narrative:      PORTABLE CHEST X-RAY     HISTORY: Stroke protocol.     Portable chest x-ray is provided. Correlation: None.     FINDINGS: The cardiomediastinal silhouette is normal. The lungs are  clear. The costophrenic sulci are dry and the bones appear normal. There  is no pneumothorax.       Impression:      Negative.     This report was finalized on 9/15/2023 11:23 AM by Dr. Shubham Oneal M.D.               I have reviewed the medications.  ---------------------------------------------------------------------------------------------  Assessment & Plan    Assessment/Problem List    TIA (transient ischemic attack)      Plan:    TIA  Consult to neurology, cleared for discharge home on dual antiplatelet therapy for 21 days then aspirin monotherapy and Lipitor 80 mg daily  CT head negative for acute findings  Cta head/neck shows left internal carotid artery 30% stenosis  MRI brain negative for acute intracranial finding  Echocardiogram shows ejection fraction 62%, negative saline test  EEG normal     Type 2 diabetes  Hold metformin for CT contrast for 72 hours  Hemoglobin A1c 9/11/2023 was 12.20    Disposition: Home    Follow-up after Discharge: PCP & neurology    This note will serve as a discharge summary.    Cadence Sepulveda, YANA 09/16/23 06:12 EDT    I have worn appropriate PPE during this patient encounter, sanitized my hands both with entering and exiting patient's room.      32 minutes has been spent by [facility] providers in the care of this patient while under observation status

## 2023-09-18 ENCOUNTER — TRANSITIONAL CARE MANAGEMENT TELEPHONE ENCOUNTER (OUTPATIENT)
Dept: CALL CENTER | Facility: HOSPITAL | Age: 53
End: 2023-09-18
Payer: COMMERCIAL

## 2023-09-18 PROBLEM — E11.43 TYPE 2 DIABETES MELLITUS WITH DIABETIC AUTONOMIC NEUROPATHY, WITHOUT LONG-TERM CURRENT USE OF INSULIN: Status: ACTIVE | Noted: 2023-09-18

## 2023-09-18 NOTE — OUTREACH NOTE
Call Center TCM Note      Flowsheet Row Responses   [facility] patient discharged from? [place]   Does the patient have one of the following disease processes/diagnoses(primary or secondary)? Stroke   TCM attempt successful? Yes   Call start time 0840   Call end time 0844   Discharge diagnosis TIA (transient ischemic attack)   Person spoke with today (if not patient) and relationship patient   Meds reviewed with patient/caregiver? Yes   Is the patient having any side effects they believe may be caused by any medication additions or changes? No   Does the patient have all medications ordered at discharge? Yes   Is the patient taking all medications as directed (includes completed medication regime)? Yes   Comments Patient has a previously scheduled followup with PCP office tomorrow on 9/19/2023. He confirmed appt and will keep as scheduled.   Does the patient have an appointment with their PCP within 7-14 days of discharge? Yes   Psychosocial issues? No   Does the patient require any assistance with activities of daily living such as eating, bathing, dressing, walking, etc.? No   Does the patient have any residual symptoms from stroke/TIA? Yes   Residual symptoms comments visual changes are improving   Does the patient understand the diet ordered at discharge? Yes   Did the patient receive a copy of their discharge instructions? Yes   Nursing interventions Reviewed instructions with patient   What is the patient's perception of their health status since discharge? Improving   Nursing interventions Nurse provided patient education   Is the patient/caregiver able to teach back signs and symptoms related to disease process for when to call PCP? Yes   Is the patient/caregiver able to teach back signs and symptoms related to disease process for when to call 911? Yes   Is the patient/caregiver able to teach back the hierarchy of who to call/visit for symptoms/problems? PCP, Specialist, Home health nurse, Urgent  [facility], ED, 911 Yes   Is the patient able to teach back FAST for Stroke? E yes: Check for vision loss, S peech: Listen for slurred speech   TCM call completed? Yes   Wrap up additional comments Patient is improving. No needs at this time.   Call end time 0844   Would this patient benefit from a Referral to [facility] Social Work? No   Is the patient interested in additional calls from an ambulatory ? No            Niranjan Womack RN    9/18/2023, 08:44 EDT

## 2023-09-19 ENCOUNTER — OFFICE VISIT (OUTPATIENT)
Dept: INTERNAL MEDICINE | Facility: CLINIC | Age: 53
End: 2023-09-19
Payer: COMMERCIAL

## 2023-09-19 VITALS — HEIGHT: 71 IN | TEMPERATURE: 98.2 F | BODY MASS INDEX: 30.21 KG/M2 | WEIGHT: 215.8 LBS

## 2023-09-19 DIAGNOSIS — E11.43 TYPE 2 DIABETES MELLITUS WITH DIABETIC AUTONOMIC NEUROPATHY, WITHOUT LONG-TERM CURRENT USE OF INSULIN: Primary | ICD-10-CM

## 2023-09-19 DIAGNOSIS — G45.9 TIA (TRANSIENT ISCHEMIC ATTACK): ICD-10-CM

## 2023-09-19 NOTE — PROGRESS NOTES
"Chief Complaint  Diabetes    Subjective        Damian Valles III presents to [facility] PRIMARY CARE  History of Present Illness      Mr. Valles is a 53-year-old male who was seen in clinic to [facility] care last week who presents for follow-up after recently diagnosed uncontrolled type 2 diabetes.      He unfortunately appeared to have had a TIA 3 days after being seen in clinic after having word finding difficulties.  He had an MRI as well as CTA of head and neck, echocardiogram that was all unremarkable besides 30% stenosis of his ICA.  He was started on aspirin and Plavix as well as atorvastatin for secondary prevention and was discharged 2 days later.    Since being discharged from the hospital he reports he does feel better and has had no vision disturbances or further episodes of word finding difficulties.  He has been regularly monitoring his blood sugars after getting a glucometer.  He brought in his readings today with the majority ranging from 160-215.  He reports he has changed his diet and is exercising more to prevent worsening of diabetes.  He was previously taking metformin but has stopped after being told to hold it on discharge due to recent contrast given for imaging.    Review of Systems   Constitutional:  Negative for fatigue.   Eyes:  Negative for visual disturbance.   Endocrine: Negative for polydipsia and polyuria.   Neurological:  Negative for syncope, speech difficulty and light-headedness.      Objective   Vital Signs:  Temp 98.2 °F (36.8 °C) (Temporal)   Ht 180 cm (70.87\")   Wt 97.9 kg (215 lb 12.8 oz)   BMI 30.21 kg/m²   Estimated body mass index is 30.21 kg/m² as calculated from the following:    Height as of this encounter: 180 cm (70.87\").    Weight as of this encounter: 97.9 kg (215 lb 12.8 oz).               Physical Exam  Vitals reviewed.   Constitutional:       General: He is not in acute distress.     Appearance: Normal appearance.   HENT:      Head: " Normocephalic and atraumatic.   Eyes:      Extraocular Movements: Extraocular movements intact.      Conjunctiva/sclera: Conjunctivae normal.      Pupils: Pupils are equal, round, and reactive to light.   Neurological:      General: No focal deficit present.      Mental Status: He is alert and oriented to person, place, and time. Mental status is at baseline.      Cranial Nerves: No cranial nerve deficit.      Sensory: No sensory deficit.   Psychiatric:         Mood and Affect: Mood normal.         Behavior: Behavior normal.      Result Review :  The following data was reviewed by: Lobito Shelton MD on 09/19/2023:  CMP          9/11/2023    15:10 9/15/2023    10:46 9/16/2023    04:10   CMP   Glucose 347  222  147    BUN 13  15  11    Creatinine 1.07  1.02  0.92    EGFR  87.9  99.5    Sodium 138  137  139    Potassium 4.4  4.2  4.0    Chloride 97  102  105    Calcium 10.0  9.7  9.2    Total Protein 7.6      Total Protein  6.7  6.5    Albumin 5.1  4.4  4.1    Globulin 2.5      Globulin  2.3  2.4    Total Bilirubin 1.3  1.1  1.0    Alkaline Phosphatase 112  76  74    AST (SGOT) 21  22  23    ALT (SGPT) 40  40  37    Albumin/Globulin Ratio  1.9  1.7    BUN/Creatinine Ratio 12.1  14.7  12.0    Anion Gap  13.1  15.0      CBC          9/11/2023    15:10 9/15/2023    10:46 9/16/2023    04:10   CBC   WBC 6.76  6.02  6.26    RBC 5.68  5.03  5.04    Hemoglobin 17.1  15.3  14.9    Hematocrit 51.1  44.1  43.3    MCV 90.0  87.7  85.9    MCH 30.1  30.4  29.6    MCHC 33.5  34.7  34.4    RDW 12.2  12.1  11.9    Platelets 197  174  165      Data reviewed : Radiologic studies MRI, CTA, echocardiogram and Recent hospitalization notes discharge summary             Assessment and Plan   Diagnoses and all orders for this visit:    1. Type 2 diabetes mellitus with diabetic autonomic neuropathy, without long-term current use of insulin (Primary)  Assessment & Plan:  Newly diagnosed, A1c noted to be 12.2% after having episodes of polydipsia  and polyuria.  Presentation is atypical, however after discussion with the patient he believes that he likely had symptoms of high blood sugars that went unnoticed for years since he had not seen a doctor.  We will plan to start insulin at this time given recent presumed TIA and severe uncontrolled hyperglycemia.  We will start 10 units of Lantus and titrate up as able.  Counseled patient to continue to record blood sugars at home and to send in readings with target of .  We will also refer him to endocrinology for assistance of management and titration of insulin as well as initiation of oral medications. Scheduled for 9/29/23 with Dr. Pina    Orders:  -     Cancel: Basic metabolic panel; Future  -     Ambulatory Referral to Endocrinology  -     glucose blood test strip; Check blood sugars fasting in the morning and 2 hours after eating  Dispense: 100 each; Refill: 2  -     Insulin Glargine (LANTUS SOLOSTAR) 100 UNIT/ML injection pen; Inject 10 Units under the skin into the appropriate area as directed Daily.  Dispense: 3 mL; Refill: 3  -     Ambulatory Referral to Diabetic Education    2. TIA (transient ischemic attack)  Assessment & Plan:  Was hospitalized 5 to 6 days ago for word finding difficulties, with negative work-up including imaging, echocardiogram, EEG.  He is on appropriate therapy since discharge of DAPT + atorvastatin 80mg. Plan to continue plavix for 21 days and ASA and atorvastatin indefinitley for secondary prevention.                 Follow Up   Return in about 1 month (around 10/19/2023).  Patient was given instructions and counseling regarding his condition or for health maintenance advice. Please see specific information pulled into the AVS if appropriate.

## 2023-09-19 NOTE — ASSESSMENT & PLAN NOTE
Was hospitalized 5 to 6 days ago for word finding difficulties, with negative work-up including imaging, echocardiogram, EEG.  He is on appropriate therapy since discharge of DAPT + atorvastatin 80mg. Plan to continue plavix for 21 days and ASA and atorvastatin indefinitley for secondary prevention.

## 2023-09-19 NOTE — ASSESSMENT & PLAN NOTE
Newly diagnosed, A1c noted to be 12.2% after having episodes of polydipsia and polyuria.  Presentation is atypical, however after discussion with the patient he believes that he likely had symptoms of high blood sugars that went unnoticed for years since he had not seen a doctor.  We will plan to start insulin at this time given recent presumed TIA and severe uncontrolled hyperglycemia.  We will start 10 units of Lantus and titrate up as able.  Counseled patient to continue to record blood sugars at home and to send in readings with target of .  We will also refer him to endocrinology for assistance of management and titration of insulin as well as initiation of oral medications. Scheduled for 9/29/23 with Dr. Pina

## 2023-09-26 ENCOUNTER — READMISSION MANAGEMENT (OUTPATIENT)
Dept: CALL CENTER | Facility: HOSPITAL | Age: 53
End: 2023-09-26
Payer: COMMERCIAL

## 2023-09-26 NOTE — OUTREACH NOTE
Stroke Week 2 Survey      Flowsheet Row Responses   [facility] patient discharged from? [place]   Does the patient have one of the following disease processes/diagnoses(primary or secondary)? Stroke   Week 2 attempt successful? Yes   Call start time 1618   Call end time 1620   Discharge diagnosis TIA (transient ischemic attack)   Meds reviewed with patient/caregiver? Yes   Is the patient having any side effects they believe may be caused by any medication additions or changes? No   Does the patient have all medications ordered at discharge? Yes   Is the patient taking all medications as directed (includes completed medication regime)? Yes   Does the patient have a primary care provider?  Yes   Has the patient kept scheduled appointments due by today? Yes   Has home health visited the patient within 72 hours of discharge? N/A   Psychosocial issues? No   Does the patient require any assistance with activities of daily living such as eating, bathing, dressing, walking, etc.? No   Does the patient have any residual symptoms from stroke/TIA? No   Does the patient understand the diet ordered at discharge? Yes   Did the patient receive a copy of their discharge instructions? Yes   Nursing interventions Reviewed instructions with patient   What is the patient's perception of their health status since discharge? Improving   Nursing interventions Nurse provided patient education   Is the patient able to teach back FAST for Stroke? B alance: Watch for sudden loss of balance, E yes: Check for vision loss, F ace: Look for an uneven smile, A rm: Check if one arm is weak, T rizwan: Call 9-1-1 right away   Is the patient/caregiver able to teach back the risk factors for a stroke? High blood pressure-goal below 120/80, High Cholesterol, Carotid or other artery disease, History of TIAs   Is the patient/caregiver able to teach back signs and symptoms related to disease process for when to call PCP? Yes   Is the patient/caregiver  able to teach back signs and symptoms related to disease process for when to call 911? Yes   If the patient is a current smoker, are they able to teach back resources for cessation? Not a smoker   Is the patient/caregiver able to teach back the hierarchy of who to call/visit for symptoms/problems? PCP, Specialist, Home health nurse, Urgent Care, ED, 911 Yes   Week 2 call completed? Yes   Call end time 1620            Amanda Edwards Nurse

## 2023-09-29 ENCOUNTER — OFFICE VISIT (OUTPATIENT)
Dept: ENDOCRINOLOGY | Age: 53
End: 2023-09-29
Payer: COMMERCIAL

## 2023-09-29 VITALS
HEIGHT: 71 IN | SYSTOLIC BLOOD PRESSURE: 108 MMHG | WEIGHT: 214.8 LBS | OXYGEN SATURATION: 96 % | DIASTOLIC BLOOD PRESSURE: 72 MMHG | BODY MASS INDEX: 30.07 KG/M2 | TEMPERATURE: 97.1 F | HEART RATE: 84 BPM

## 2023-09-29 DIAGNOSIS — E11.43 TYPE 2 DIABETES MELLITUS WITH DIABETIC AUTONOMIC NEUROPATHY, WITHOUT LONG-TERM CURRENT USE OF INSULIN: Primary | ICD-10-CM

## 2023-09-29 NOTE — PROGRESS NOTES
Referring provider: Lobito Shelton MD     Chief complaint:  T2DM    HPI:   - 53 year old female here for management of diabetes mellitus type 2  - Has had diabetes in 9/2023  - Complications include TIA  - Is currently taking Lantus 10 units daily  - Denies hypoglycemia  - Since his diagnosis of diabetes he has changes his diet and increased his exercise and has lost over 20 pounds  - He is checking his BG levels several times daily and they generally run between 100-150    The following portions of the patient's history were reviewed and updated as appropriate: allergies, current medications, past family history, past medical history, past social history, past surgical history, and problem list.    Objective     Vitals:    09/29/23 1138   BP: 108/72   Pulse: 84   Temp: 97.1 °F (36.2 °C)   SpO2: 96%        Physical Exam  Vitals reviewed.   Constitutional:       Appearance: Normal appearance.   Eyes:      General: No scleral icterus.  Pulmonary:      Effort: Pulmonary effort is normal. No respiratory distress.   Neurological:      Mental Status: He is alert.      Gait: Gait normal.   Psychiatric:         Mood and Affect: Mood normal.         Behavior: Behavior normal.         Thought Content: Thought content normal.         Judgment: Judgment normal.       Labs/Imaging:  A1c was 12.2% in 9/2023    Assessment & Plan   T2DM  - His lifestyle changes have resulted in significantly improved glycemic control and he plans on continuing his changes  - He is currently on Lantus 10 units daily but as he changes his diet and looses weight he may be able to come off insulin in the future    - Return to clinic in 4 months

## 2023-10-03 ENCOUNTER — HOSPITAL ENCOUNTER (OUTPATIENT)
Dept: DIABETES SERVICES | Facility: HOSPITAL | Age: 53
Setting detail: RECURRING SERIES
Discharge: HOME | End: 2023-10-03
Payer: COMMERCIAL

## 2023-10-03 PROCEDURE — G0109 DIAB MANAGE TRN IND/GROUP: HCPCS

## 2023-10-04 ENCOUNTER — READMISSION MANAGEMENT (OUTPATIENT)
Dept: CALL CENTER | Facility: HOSPITAL | Age: 53
End: 2023-10-04
Payer: COMMERCIAL

## 2023-10-04 NOTE — OUTREACH NOTE
Stroke Week 3 Survey      Flowsheet Row Responses   [facility] patient discharged from? [place]   Does the patient have one of the following disease processes/diagnoses(primary or secondary)? Stroke   Week 3 attempt successful? Yes   Call start time 1459   Call end time 1501   Discharge diagnosis TIA (transient ischemic attack)   Meds reviewed with patient/caregiver? Yes   Is the patient having any side effects they believe may be caused by any medication additions or changes? No   Does the patient have all medications ordered at discharge? Yes   Is the patient taking all medications as directed (includes completed medication regime)? Yes   Does the patient have a primary care provider?  Yes   Does the patient have an appointment with their PCP within 7 days of discharge? Yes   Has the patient kept scheduled appointments due by today? Yes   Has home health visited the patient within 72 hours of discharge? N/A   Psychosocial issues? No   Does the patient require any assistance with activities of daily living such as eating, bathing, dressing, walking, etc.? No   Does the patient have any residual symptoms from stroke/TIA? No   Does the patient understand the diet ordered at discharge? Yes   Did the patient receive a copy of their discharge instructions? Yes   Nursing interventions Reviewed instructions with patient   What is the patient's perception of their health status since discharge? Improving   Nursing interventions Nurse provided patient education   Is the patient able to teach back FAST for Stroke? B alance: Watch for sudden loss of balance, E yes: Check for vision loss, F ace: Look for an uneven smile, A rm: Check if one arm is weak, S peech: Listen for slurred speech, T rizwan: Call 9-1-1 right away   Is the patient/caregiver able to teach back the risk factors for a stroke? High blood pressure-goal below 120/80, History of TIAs, Diabetes   Is the patient/caregiver able to teach back signs and  symptoms related to disease process for when to call PCP? Yes   Is the patient/caregiver able to teach back signs and symptoms related to disease process for when to call 911? Yes   Is the patient/caregiver able to teach back the hierarchy of who to call/visit for symptoms/problems? PCP, Specialist, Home health nurse, Urgent Care, ED, 911 Yes            Patrica GARCIA - Registered Nurse

## 2023-10-10 ENCOUNTER — HOSPITAL ENCOUNTER (OUTPATIENT)
Dept: DIABETES SERVICES | Facility: HOSPITAL | Age: 53
Setting detail: RECURRING SERIES
Discharge: HOME | End: 2023-10-10
Payer: COMMERCIAL

## 2023-10-10 PROCEDURE — G0109 DIAB MANAGE TRN IND/GROUP: HCPCS

## 2023-10-17 ENCOUNTER — HOSPITAL ENCOUNTER (OUTPATIENT)
Dept: DIABETES SERVICES | Facility: HOSPITAL | Age: 53
Setting detail: RECURRING SERIES
Discharge: HOME | End: 2023-10-17
Payer: COMMERCIAL

## 2023-10-17 PROCEDURE — G0109 DIAB MANAGE TRN IND/GROUP: HCPCS

## 2023-10-17 NOTE — CONSULTS
Juan was seen today by Registered Dietitian for the final Diabetes Education Class. Consistent with the ADA’s standards of care, a comprehensive assessment/training record and patient goals have been sent to medical records (see media tab).    Juan has been encouraged to call our office with questions or additional education needs. Please place referral for additional services or follow-up should need arise.    Thank you for the referral.

## 2023-10-20 ENCOUNTER — OFFICE VISIT (OUTPATIENT)
Dept: INTERNAL MEDICINE | Facility: CLINIC | Age: 53
End: 2023-10-20
Payer: COMMERCIAL

## 2023-10-20 VITALS
OXYGEN SATURATION: 95 % | BODY MASS INDEX: 31.3 KG/M2 | TEMPERATURE: 97.1 F | SYSTOLIC BLOOD PRESSURE: 112 MMHG | HEIGHT: 70 IN | WEIGHT: 218.63 LBS | DIASTOLIC BLOOD PRESSURE: 80 MMHG | HEART RATE: 73 BPM

## 2023-10-20 DIAGNOSIS — Z79.4 TYPE 2 DIABETES MELLITUS WITH HYPERGLYCEMIA, WITH LONG-TERM CURRENT USE OF INSULIN: Primary | ICD-10-CM

## 2023-10-20 DIAGNOSIS — Z23 NEED FOR INFLUENZA VACCINATION: ICD-10-CM

## 2023-10-20 DIAGNOSIS — E11.65 TYPE 2 DIABETES MELLITUS WITH HYPERGLYCEMIA, WITH LONG-TERM CURRENT USE OF INSULIN: Primary | ICD-10-CM

## 2023-10-20 DIAGNOSIS — Z00.00 HEALTHCARE MAINTENANCE: ICD-10-CM

## 2023-10-20 NOTE — ASSESSMENT & PLAN NOTE
Colonoscopy: pending referral  LDCT: never smoker  AAA: never smoker    Immunizations: eligible for influenza, PCV, shingrix. Will give influenza today, plan to get PCV at next visit    Labs: ordered urine microalbumin to be done at next visit.     The 10-year ASCVD risk score (Atul JIMÉNEZ, et al., 2019) is: 13.1%    Values used to calculate the score:      Age: 53 years      Sex: Male      Is Non- : No      Diabetic: Yes      Tobacco smoker: No      Systolic Blood Pressure: 112 mmHg      Is BP treated: No      HDL Cholesterol: 25 mg/dL      Total Cholesterol: 199 mg/dL

## 2023-10-20 NOTE — ASSESSMENT & PLAN NOTE
Diabetes is improving with treatment.   Continue current treatment regimen.  Regular aerobic exercise.  Discussed foot care.  Reminded to get yearly retinal exam.  Diabetes will be reassessed  December, will repeat A1c and obtain urine microalbumin .

## 2023-10-20 NOTE — PROGRESS NOTES
"Chief Complaint  Type 2 diabetes mellitus with diabetic autonomic neuropathy    Subjective        Damian Valles III presents to [facility] PRIMARY [facility]ARE  History of Present Illness    Mr. Valles presents to clinic today for follow-up of type 2 diabetes.  He is currently taking 10 units of insulin and is doing extremely well.  He has brought in blood sugar recordings for the past 2 weeks with average blood sugars throughout the day from 110-120.  Has had no hypoglycemic events and highest reading being 191 on 10/1.  He has also seen diabetes education and nutrition and has had modifications to his diet as well as he has been walking daily.  He denies any numbness or tingling in extremities, polydipsia, or polyuria.  Has had no recurrence of speech difficulties or vision disturbances      Review of Systems   Eyes:  Negative for visual disturbance.   Gastrointestinal:  Negative for abdominal pain, nausea and vomiting.   Endocrine: Negative for polydipsia, polyphagia and polyuria.   Neurological:  Negative for speech difficulty and numbness.        Objective   Vital Signs:  /80   Pulse 73   Temp 97.1 °F (36.2 °C) (Temporal)   Ht 177.8 cm (70\")   Wt 99.2 kg (218 lb 10.1 oz)   SpO2 95%   BMI 31.37 kg/m²   Estimated body mass index is 31.37 kg/m² as calculated from the following:    Height as of this encounter: 177.8 cm (70\").    Weight as of this encounter: 99.2 kg (218 lb 10.1 oz).               Physical Exam  Vitals reviewed.   Constitutional:       Appearance: Normal appearance. He is normal weight.   HENT:      Head: Normocephalic and atraumatic.   Eyes:      Extraocular Movements: Extraocular movements intact.      Conjunctiva/sclera: Conjunctivae normal.   Pulmonary:      Effort: Pulmonary effort is normal. No respiratory distress.   Skin:     General: Skin is warm and dry.   Neurological:      General: No focal deficit present.      Mental Status: He is alert and oriented to person, " place, and time. Mental status is at baseline.   Psychiatric:         Mood and Affect: Mood normal.         Behavior: Behavior normal.        Result Review :  The following data was reviewed by: Lobito Shelton MD on 10/20/2023:  Common labs          9/11/2023    15:10 9/15/2023    10:46 9/16/2023    04:10   Common Labs   Glucose 347  222  147    BUN 13  15  11    Creatinine 1.07  1.02  0.92    Sodium 138  137  139    Potassium 4.4  4.2  4.0    Chloride 97  102  105    Calcium 10.0  9.7  9.2    Total Protein 7.6      Albumin 5.1  4.4  4.1    Total Bilirubin 1.3  1.1  1.0    Alkaline Phosphatase 112  76  74    AST (SGOT) 21  22  23    ALT (SGPT) 40  40  37    WBC 6.76  6.02  6.26    Hemoglobin 17.1  15.3  14.9    Hematocrit 51.1  44.1  43.3    Platelets 197  174  165    Total Cholesterol  199     Total Cholesterol 216      Triglycerides 377  192     HDL Cholesterol 28  25     LDL Cholesterol  121  139     Hemoglobin A1C 12.20  12.40       Most Recent A1C          9/15/2023    10:46   HGBA1C Most Recent   Hemoglobin A1C 12.40      Data reviewed : Consultant notes endocrinology visit 9/29/23             Assessment and Plan   Diagnoses and all orders for this visit:    1. Type 2 diabetes mellitus with hyperglycemia, with long-term current use of insulin (Primary)  Assessment & Plan:  Diabetes is improving with treatment.   Continue current treatment regimen.  Regular aerobic exercise.  Discussed foot care.  Reminded to get yearly retinal exam.  Diabetes will be reassessed  December, will repeat A1c and obtain urine microalbumin .    Orders:  -     MicroAlbumin, Urine, Random - Urine, Clean Catch; Future    2. Need for influenza vaccination  -     Fluzone (or Fluarix & Flulaval for VFC) >6mos    3. Healthcare maintenance  Assessment & Plan:  Colonoscopy: pending referral  LDCT: never smoker  AAA: never smoker    Immunizations: eligible for influenza, PCV, shingrix. Will give influenza today, plan to get PCV at next visit     Labs: ordered urine microalbumin to be done at next visit.     The 10-year ASCVD risk score (Atul JIMÉNEZ, et al., 2019) is: 13.1%    Values used to calculate the score:      Age: 53 years      Sex: Male      Is Non- : No      Diabetic: Yes      Tobacco smoker: No      Systolic Blood Pressure: 112 mmHg      Is BP treated: No      HDL Cholesterol: 25 mg/dL      Total Cholesterol: 199 mg/dL                 Follow Up   No follow-ups on file.  Patient was given instructions and counseling regarding his condition or for health maintenance advice. Please see specific information pulled into the AVS if appropriate.

## 2023-12-11 ENCOUNTER — OFFICE VISIT (OUTPATIENT)
Dept: INTERNAL MEDICINE | Facility: CLINIC | Age: 53
End: 2023-12-11
Payer: COMMERCIAL

## 2023-12-11 VITALS
HEIGHT: 70 IN | DIASTOLIC BLOOD PRESSURE: 80 MMHG | WEIGHT: 221.2 LBS | BODY MASS INDEX: 31.67 KG/M2 | SYSTOLIC BLOOD PRESSURE: 122 MMHG | TEMPERATURE: 97.3 F

## 2023-12-11 DIAGNOSIS — Z79.4 TYPE 2 DIABETES MELLITUS WITH HYPERGLYCEMIA, WITH LONG-TERM CURRENT USE OF INSULIN: Primary | ICD-10-CM

## 2023-12-11 DIAGNOSIS — E11.65 TYPE 2 DIABETES MELLITUS WITH HYPERGLYCEMIA, WITH LONG-TERM CURRENT USE OF INSULIN: Primary | ICD-10-CM

## 2023-12-11 DIAGNOSIS — E78.5 HYPERLIPIDEMIA, UNSPECIFIED HYPERLIPIDEMIA TYPE: ICD-10-CM

## 2023-12-11 DIAGNOSIS — Z23 NEED FOR PNEUMOCOCCAL VACCINE: ICD-10-CM

## 2023-12-11 NOTE — PROGRESS NOTES
"Chief Complaint  Diabetes and Hyperlipidemia    Subjective        Damian Valles III presents to [facility] PRIMARY CARE  History of Present Illness    Mr. Valles presents to clinic today for follow up of T2DM    Since last visit he is overall doing well.  He does report that his average blood sugar has increased from 1 20-1 25 and attributed to daylight savings time as he has not been able to exercise as much.  He continues to take insulin and is tolerating it well.  He does report of having difficulty taking the Lipitor due to the pill size however denies any specific myalgias or other side effects.    Review of Systems   Constitutional:  Negative for fatigue and fever.   Cardiovascular:  Negative for chest pain.   Gastrointestinal:  Negative for abdominal pain, constipation, diarrhea and nausea.   Endocrine: Negative for polydipsia and polyuria.   Musculoskeletal:  Positive for arthralgias. Negative for myalgias.   Neurological:  Negative for dizziness, weakness and numbness.        Objective   Vital Signs:  /80   Temp 97.3 °F (36.3 °C) (Temporal)   Ht 177.8 cm (70\")   Wt 100 kg (221 lb 3.2 oz)   BMI 31.74 kg/m²   Estimated body mass index is 31.74 kg/m² as calculated from the following:    Height as of this encounter: 177.8 cm (70\").    Weight as of this encounter: 100 kg (221 lb 3.2 oz).               Physical Exam  Vitals reviewed.   Constitutional:       Appearance: Normal appearance. He is not ill-appearing.   HENT:      Head: Normocephalic and atraumatic.   Eyes:      General: No scleral icterus.     Extraocular Movements: Extraocular movements intact.      Conjunctiva/sclera: Conjunctivae normal.   Pulmonary:      Effort: Pulmonary effort is normal. No respiratory distress.   Musculoskeletal:         General: No swelling or deformity. Normal range of motion.   Skin:     General: Skin is warm and dry.   Neurological:      General: No focal deficit present.      Mental Status: " He is alert and oriented to person, place, and time.      Motor: No weakness.      Gait: Gait normal.   Psychiatric:         Mood and Affect: Mood normal.         Behavior: Behavior normal.        Result Review :                   Assessment and Plan   Diagnoses and all orders for this visit:    1. Type 2 diabetes mellitus with hyperglycemia, with long-term current use of insulin (Primary)  Assessment & Plan:  Diabetes is improving with treatment.   Continue current treatment regimen.  Regular aerobic exercise.  Discussed foot care.  Will repeat A1c today, plan to adjust insulin if able. Patient would rather continue insulin at this point instead of any PO option   Diabetes will be reassessed in 3 months.    Orders:  -     Hemoglobin A1c    2. Hyperlipidemia, unspecified hyperlipidemia type  Assessment & Plan:  Chronic, stable  Initiated lipitor after concern for TIA and T2DM diagnosis, will recheck lipid panel today     Orders:  -     Lipid Panel    3. Need for pneumococcal vaccine  -     Pneumococcal Conjugate Vaccine 20-Valent All             Follow Up   Return in about 3 months (around 3/11/2024).  Patient was given instructions and counseling regarding his condition or for health maintenance advice. Please see specific information pulled into the AVS if appropriate.

## 2023-12-11 NOTE — ASSESSMENT & PLAN NOTE
Chronic, stable  Initiated lipitor after concern for TIA and T2DM diagnosis, will recheck lipid panel today

## 2023-12-11 NOTE — ASSESSMENT & PLAN NOTE
Diabetes is improving with treatment.   Continue current treatment regimen.  Regular aerobic exercise.  Discussed foot care.  Will repeat A1c today, plan to adjust insulin if able. Patient would rather continue insulin at this point instead of any PO option   Diabetes will be reassessed in 3 months.

## 2023-12-12 LAB
CHOLEST SERPL-MCNC: 117 MG/DL (ref 0–200)
HBA1C MFR BLD: 6.5 % (ref 4.8–5.6)
HDLC SERPL-MCNC: 34 MG/DL (ref 40–60)
LDLC SERPL CALC-MCNC: 69 MG/DL (ref 0–100)
TRIGL SERPL-MCNC: 64 MG/DL (ref 0–150)
VLDLC SERPL CALC-MCNC: 14 MG/DL (ref 5–40)

## 2024-02-13 ENCOUNTER — OFFICE VISIT (OUTPATIENT)
Dept: ENDOCRINOLOGY | Age: 54
End: 2024-02-13
Payer: COMMERCIAL

## 2024-02-13 VITALS
HEIGHT: 70 IN | OXYGEN SATURATION: 95 % | SYSTOLIC BLOOD PRESSURE: 120 MMHG | WEIGHT: 222.6 LBS | DIASTOLIC BLOOD PRESSURE: 84 MMHG | BODY MASS INDEX: 31.87 KG/M2 | HEART RATE: 78 BPM

## 2024-02-13 DIAGNOSIS — E11.43 TYPE 2 DIABETES MELLITUS WITH DIABETIC AUTONOMIC NEUROPATHY, WITHOUT LONG-TERM CURRENT USE OF INSULIN: Primary | ICD-10-CM

## 2024-02-13 PROCEDURE — 99213 OFFICE O/P EST LOW 20 MIN: CPT | Performed by: INTERNAL MEDICINE

## 2024-03-18 ENCOUNTER — OFFICE VISIT (OUTPATIENT)
Dept: INTERNAL MEDICINE | Facility: CLINIC | Age: 54
End: 2024-03-18
Payer: COMMERCIAL

## 2024-03-18 VITALS
HEIGHT: 70 IN | BODY MASS INDEX: 31.7 KG/M2 | TEMPERATURE: 97.2 F | HEART RATE: 76 BPM | OXYGEN SATURATION: 98 % | DIASTOLIC BLOOD PRESSURE: 80 MMHG | WEIGHT: 221.4 LBS | SYSTOLIC BLOOD PRESSURE: 128 MMHG

## 2024-03-18 DIAGNOSIS — E11.43 TYPE 2 DIABETES MELLITUS WITH DIABETIC AUTONOMIC NEUROPATHY, WITHOUT LONG-TERM CURRENT USE OF INSULIN: Primary | ICD-10-CM

## 2024-03-18 DIAGNOSIS — Z12.11 SCREENING FOR COLON CANCER: ICD-10-CM

## 2024-03-18 DIAGNOSIS — E78.5 HYPERLIPIDEMIA, UNSPECIFIED HYPERLIPIDEMIA TYPE: ICD-10-CM

## 2024-03-18 RX ORDER — ROSUVASTATIN CALCIUM 20 MG/1
20 TABLET, COATED ORAL DAILY
Qty: 30 TABLET | Refills: 3 | Status: SHIPPED | OUTPATIENT
Start: 2024-03-18

## 2024-03-18 NOTE — PROGRESS NOTES
"Chief Complaint  Diabetes and Hyperlipidemia    Subjective        Damian Valles III presents to [facility]  History of Present Illness    Mr. Valles presents to clinic today for follow-up of type 2 diabetes    He continues to do exceptionally well and monitors his blood sugars frequently. Ranging from , reports tried coming off insulin and doing well however reports readings creeped up over past few weeks and gotten up to 180.    Continues to exercise regularly and watch his diet.       Review of Systems   Constitutional:  Negative for fatigue.   Respiratory:  Negative for shortness of breath.    Cardiovascular:  Negative for chest pain and leg swelling.   Gastrointestinal:  Negative for abdominal pain, constipation and diarrhea.   Skin:  Negative for rash.   Psychiatric/Behavioral:  The patient is not nervous/anxious.         Objective   Vital Signs:  /80   Pulse 76   Temp 97.2 °F (36.2 °C) (Temporal)   Ht 177.8 cm (70\")   Wt 100 kg (221 lb 6.4 oz)   SpO2 98%   BMI 31.77 kg/m²   Estimated body mass index is 31.77 kg/m² as calculated from the following:    Height as of this encounter: 177.8 cm (70\").    Weight as of this encounter: 100 kg (221 lb 6.4 oz).               Physical Exam  Vitals reviewed.   Constitutional:       Appearance: Normal appearance. He is not ill-appearing.   HENT:      Head: Normocephalic and atraumatic.   Eyes:      General: No scleral icterus.     Extraocular Movements: Extraocular movements intact.      Conjunctiva/sclera: Conjunctivae normal.   Pulmonary:      Effort: Pulmonary effort is normal. No respiratory distress.   Musculoskeletal:         General: No swelling or deformity. Normal range of motion.   Skin:     General: Skin is warm and dry.   Neurological:      General: No focal deficit present.      Mental Status: He is alert and oriented to person, place, and time.      Motor: No weakness.      Gait: Gait normal.   Psychiatric:    "      Mood and Affect: Mood normal.         Behavior: Behavior normal.        Result Review :  The following data was reviewed by: Lobito Shelton MD on 03/18/2024:  Most Recent A1C          1/26/2024    09:36   HGBA1C Most Recent   Hemoglobin A1C 6.30                   Assessment and Plan   Diagnoses and all orders for this visit:    1. Type 2 diabetes mellitus with diabetic autonomic neuropathy, without long-term current use of insulin (Primary)    2. Screening for colon cancer  -     Ambulatory Referral For Screening Colonoscopy    3. Hyperlipidemia, unspecified hyperlipidemia type  -     rosuvastatin (Crestor) 20 MG tablet; Take 1 tablet by mouth Daily.  Dispense: 30 tablet; Refill: 3      Reviewed endocrine OV    Continues to do great on current insulin regimen, discussed can taper insulin as able as long as he continues his healthy lifestyle changes.     He reports he stopped taking atorvastatin due to size of pill, so we will switch to rosuvastatin due to smaller pill size.                Follow Up   Return in about 6 months (around 9/18/2024) for Annual physical.  Patient was given instructions and counseling regarding his condition or for health maintenance advice. Please see specific information pulled into the AVS if appropriate.

## 2024-06-14 DIAGNOSIS — E78.5 HYPERLIPIDEMIA, UNSPECIFIED HYPERLIPIDEMIA TYPE: ICD-10-CM

## 2024-06-14 RX ORDER — ROSUVASTATIN CALCIUM 20 MG/1
20 TABLET, COATED ORAL DAILY
Qty: 90 TABLET | Refills: 1 | Status: SHIPPED | OUTPATIENT
Start: 2024-06-14

## 2024-08-05 ENCOUNTER — TELEPHONE (OUTPATIENT)
Dept: ENDOCRINOLOGY | Age: 54
End: 2024-08-05

## 2024-08-05 NOTE — TELEPHONE ENCOUNTER
Hub staff attempted to follow warm transfer process and was unsuccessful     Caller: Damian Valles III    Relationship to patient: Self    Best call back number: [phone]    Patient is needing: PATIENT NEEDS TO RESCHEDULE HIS [facility] APPT FOR HIS BLOOD WORK PRIOR TO HIS APPT ON 8-13-24 WITH DR SHAFFER

## 2024-08-13 ENCOUNTER — OFFICE VISIT (OUTPATIENT)
Dept: ENDOCRINOLOGY | Age: 54
End: 2024-08-13
Payer: COMMERCIAL

## 2024-08-13 VITALS
BODY MASS INDEX: 30.29 KG/M2 | SYSTOLIC BLOOD PRESSURE: 124 MMHG | HEIGHT: 70 IN | HEART RATE: 51 BPM | WEIGHT: 211.6 LBS | OXYGEN SATURATION: 95 % | DIASTOLIC BLOOD PRESSURE: 82 MMHG

## 2024-08-13 DIAGNOSIS — E11.43 TYPE 2 DIABETES MELLITUS WITH DIABETIC AUTONOMIC NEUROPATHY, WITHOUT LONG-TERM CURRENT USE OF INSULIN: Primary | ICD-10-CM

## 2024-08-13 DIAGNOSIS — E66.9 OBESITY, UNSPECIFIED CLASSIFICATION, UNSPECIFIED OBESITY TYPE, UNSPECIFIED WHETHER SERIOUS COMORBIDITY PRESENT: ICD-10-CM

## 2024-08-13 PROCEDURE — 99214 OFFICE O/P EST MOD 30 MIN: CPT | Performed by: INTERNAL MEDICINE

## 2024-08-13 NOTE — PROGRESS NOTES
Chief complaint/Reason for consult:  T2DM    HPI:   - 54 year old male here for management of diabetes mellitus type 2  - Last seen in 2/2024  - No changes since last visit  - Has had diabetes in 9/2023  - Complications include TIA  - Is currently taking Lantus 10 units daily  - Denies hypoglycemia  - Since his diagnosis of diabetes he has changes his diet and increased his exercise and has lost over 20 pounds  - He is checking his BG levels several times daily and they generally run between 100-130    The following portions of the patient's history were reviewed and updated as appropriate: allergies, current medications, past family history, past medical history, past social history, past surgical history, and problem list.    Objective     Vitals:    08/13/24 1120   BP: 124/82   Pulse: 51   SpO2: 95%      Physical Exam  Vitals reviewed.   Constitutional:       Appearance: Normal appearance.   HENT:      Head: Normocephalic and atraumatic.   Eyes:      General: No scleral icterus.  Pulmonary:      Effort: Pulmonary effort is normal. No respiratory distress.   Neurological:      Mental Status: He is alert.      Gait: Gait normal.   Psychiatric:         Mood and Affect: Mood normal.         Behavior: Behavior normal.         Thought Content: Thought content normal.         Judgment: Judgment normal.     Assessment & Plan   T2DM, controlled  - His lifestyle changes have resulted in significantly improved glycemic control and he plans on continuing his changes  - I do think he could try to stop Lantus    2. Obesity  - Encouraged him to continue lifestyle changes     - Return to clinic in 6 months

## 2024-09-30 ENCOUNTER — OFFICE VISIT (OUTPATIENT)
Dept: INTERNAL MEDICINE | Facility: CLINIC | Age: 54
End: 2024-09-30
Payer: COMMERCIAL

## 2024-09-30 VITALS
HEART RATE: 65 BPM | HEIGHT: 70 IN | TEMPERATURE: 97 F | DIASTOLIC BLOOD PRESSURE: 70 MMHG | BODY MASS INDEX: 29.41 KG/M2 | OXYGEN SATURATION: 95 % | WEIGHT: 205.4 LBS | SYSTOLIC BLOOD PRESSURE: 112 MMHG

## 2024-09-30 DIAGNOSIS — Z23 NEED FOR INFLUENZA VACCINATION: ICD-10-CM

## 2024-09-30 DIAGNOSIS — Z00.00 ANNUAL PHYSICAL EXAM: Primary | ICD-10-CM

## 2024-09-30 DIAGNOSIS — Z12.5 SCREENING FOR PROSTATE CANCER: ICD-10-CM

## 2024-09-30 PROCEDURE — 99396 PREV VISIT EST AGE 40-64: CPT | Performed by: STUDENT IN AN ORGANIZED HEALTH CARE EDUCATION/TRAINING PROGRAM

## 2024-09-30 PROCEDURE — 90656 IIV3 VACC NO PRSV 0.5 ML IM: CPT | Performed by: STUDENT IN AN ORGANIZED HEALTH CARE EDUCATION/TRAINING PROGRAM

## 2024-09-30 PROCEDURE — 90471 IMMUNIZATION ADMIN: CPT | Performed by: STUDENT IN AN ORGANIZED HEALTH CARE EDUCATION/TRAINING PROGRAM

## 2024-09-30 NOTE — PROGRESS NOTES
"Chief Complaint  Annual Exam    Subjective        Damian Valles III presents to [facility]  History of Present Illness    Presents to clinic today for annual physical    Since last visit he was seen by endocrinology where he was taken off of all of his insulin.  He continues to monitor his blood sugars and reports readings largely all below 150 however does have occasional increase if he eats certain foods and drinks.    He is exercising regularly, largely avoids alcohol specifically liquor, and mood is stable    Review of Systems   Constitutional:  Negative for fatigue, fever and unexpected weight change.   HENT:  Negative for congestion and rhinorrhea.    Eyes:  Negative for photophobia and visual disturbance.   Respiratory:  Negative for chest tightness and shortness of breath.    Cardiovascular:  Negative for chest pain and palpitations.   Gastrointestinal:  Negative for abdominal pain, diarrhea and vomiting.   Endocrine: Negative for polydipsia and polyuria.   Genitourinary:  Negative for difficulty urinating, frequency and hematuria.   Musculoskeletal:  Negative for arthralgias, gait problem and joint swelling.   Skin:  Negative for color change and rash.   Neurological:  Negative for seizures, syncope and headaches.   Psychiatric/Behavioral:  Negative for self-injury and suicidal ideas. The patient is not nervous/anxious.        Objective   Vital Signs:  /70   Pulse 65   Temp 97 °F (36.1 °C) (Temporal)   Ht 177.8 cm (70\")   Wt 93.2 kg (205 lb 6.4 oz)   SpO2 95%   BMI 29.47 kg/m²   Estimated body mass index is 29.47 kg/m² as calculated from the following:    Height as of this encounter: 177.8 cm (70\").    Weight as of this encounter: 93.2 kg (205 lb 6.4 oz).          Physical Exam  Vitals reviewed.   Constitutional:       General: He is not in acute distress.     Appearance: Normal appearance.   HENT:      Head: Normocephalic and atraumatic.      Right Ear: External " ear normal.      Left Ear: External ear normal.      Nose: Nose normal. No rhinorrhea.   Eyes:      General:         Right eye: No discharge.         Left eye: No discharge.      Extraocular Movements: Extraocular movements intact.      Conjunctiva/sclera: Conjunctivae normal.   Cardiovascular:      Rate and Rhythm: Normal rate and regular rhythm.      Heart sounds: Normal heart sounds.   Pulmonary:      Effort: Pulmonary effort is normal. No respiratory distress.      Breath sounds: Normal breath sounds.   Abdominal:      General: Abdomen is flat. There is no distension.      Palpations: Abdomen is soft.   Musculoskeletal:         General: No swelling or deformity. Normal range of motion.      Cervical back: Normal range of motion and neck supple.   Skin:     General: Skin is warm and dry.   Neurological:      General: No focal deficit present.      Mental Status: He is alert and oriented to person, place, and time. Mental status is at baseline.   Psychiatric:         Mood and Affect: Mood normal.         Behavior: Behavior normal.        Result Review :                Assessment and Plan   Diagnoses and all orders for this visit:    1. Annual physical exam (Primary)  -     Lipid Panel With / Chol / HDL Ratio; Future  -     CBC & Differential; Future    2. Need for influenza vaccination  -     Fluzone >6mos    3. Screening for prostate cancer  -     PSA Screen; Future      Immunizations: flu given today, due for shingrix and will have him return to have this completed  Cancer screening: c-scope due, he will fill out paperwork or call if needs another referral  Metabolic Labs: ordered  Recommend maintaining a healthy lifestyle, rich in whole grains, fruits and vegetables. Limit high saturated fats and processed sugars. Maintain an active lifestyle with goal of 150 min of moderate aerobic exercise per week      He is doing great and has not needed any diabetic medications with lifestyle changes.  Congratulated  patient, will have him return to clinic in about 1 year for physical or sooner as needed           Follow Up   Return in about 1 year (around 9/30/2025) for Annual physical.  Patient was given instructions and counseling regarding his condition or for health maintenance advice. Please see specific information pulled into the AVS if appropriate.

## 2024-10-04 ENCOUNTER — CLINICAL SUPPORT (OUTPATIENT)
Dept: INTERNAL MEDICINE | Facility: CLINIC | Age: 54
End: 2024-10-04
Payer: COMMERCIAL

## 2024-10-04 DIAGNOSIS — Z23 NEED FOR SHINGLES VACCINE: Primary | ICD-10-CM

## 2025-02-13 ENCOUNTER — OFFICE VISIT (OUTPATIENT)
Dept: ENDOCRINOLOGY | Age: 55
End: 2025-02-13
Payer: COMMERCIAL

## 2025-02-13 VITALS
BODY MASS INDEX: 30.06 KG/M2 | HEIGHT: 70 IN | SYSTOLIC BLOOD PRESSURE: 126 MMHG | WEIGHT: 210 LBS | DIASTOLIC BLOOD PRESSURE: 82 MMHG | HEART RATE: 82 BPM | OXYGEN SATURATION: 96 %

## 2025-02-13 DIAGNOSIS — E11.43 TYPE 2 DIABETES MELLITUS WITH DIABETIC AUTONOMIC NEUROPATHY, WITHOUT LONG-TERM CURRENT USE OF INSULIN: Primary | ICD-10-CM

## 2025-02-13 DIAGNOSIS — E66.9 OBESITY WITH SERIOUS COMORBIDITY, UNSPECIFIED CLASS, UNSPECIFIED OBESITY TYPE: ICD-10-CM

## 2025-02-13 PROCEDURE — 99214 OFFICE O/P EST MOD 30 MIN: CPT | Performed by: INTERNAL MEDICINE

## 2025-02-13 NOTE — PROGRESS NOTES
Chief complaint/Reason for consult: T2DM    HPI:   - 54 year old male here for management of diabetes mellitus type 2  - Last seen in 8/2024  - No changes since last visit  - Has had diabetes in 9/2023  - Complications include TIA  - Is currently taking Lantus 10 units daily  - Denies hypoglycemia  - Since his diagnosis of diabetes he has changes his diet and increased his exercise and has lost over 20 pounds  - He is checking his BG levels several times daily and they generally run between 100-130    The following portions of the patient's history were reviewed and updated as appropriate: allergies, current medications, past family history, past medical history, past social history, past surgical history, and problem list.      Objective     Vitals:    02/13/25 0937   BP: 126/82   Pulse: 82   SpO2: 96%        Physical Exam  Vitals reviewed.   Constitutional:       Appearance: Normal appearance.   HENT:      Head: Normocephalic and atraumatic.   Eyes:      General: No scleral icterus.  Pulmonary:      Effort: Pulmonary effort is normal. No respiratory distress.   Neurological:      Mental Status: He is alert.      Gait: Gait normal.   Psychiatric:         Mood and Affect: Mood normal.         Behavior: Behavior normal.         Thought Content: Thought content normal.         Judgment: Judgment normal.         Assessment & Plan   T2DM, controlled  - His lifestyle changes have resulted in significantly improved glycemic control and he plans on continuing his changes  - I do think he could try to stop Lantus     2. Obesity  - Encouraged him to continue lifestyle changes     - Return to clinic in 6 months